# Patient Record
Sex: MALE | Race: WHITE | NOT HISPANIC OR LATINO | Employment: FULL TIME | URBAN - METROPOLITAN AREA
[De-identification: names, ages, dates, MRNs, and addresses within clinical notes are randomized per-mention and may not be internally consistent; named-entity substitution may affect disease eponyms.]

---

## 2023-12-31 DIAGNOSIS — K52.9 NONINFECTIVE GASTROENTERITIS AND COLITIS, UNSPECIFIED: ICD-10-CM

## 2024-01-03 ENCOUNTER — TELEPHONE (OUTPATIENT)
Dept: GASTROENTEROLOGY | Facility: CLINIC | Age: 64
End: 2024-01-03
Payer: COMMERCIAL

## 2024-01-03 DIAGNOSIS — K52.9 NONINFECTIVE GASTROENTERITIS AND COLITIS, UNSPECIFIED: ICD-10-CM

## 2024-01-03 RX ORDER — BUDESONIDE 9 MG/1
TABLET, EXTENDED RELEASE ORAL
Qty: 15 TABLET | Refills: 8 | Status: SHIPPED | OUTPATIENT
Start: 2024-01-03 | End: 2024-02-09

## 2024-01-26 ENCOUNTER — OFFICE VISIT (OUTPATIENT)
Dept: GASTROENTEROLOGY | Facility: CLINIC | Age: 64
End: 2024-01-26
Payer: COMMERCIAL

## 2024-01-26 VITALS
OXYGEN SATURATION: 96 % | DIASTOLIC BLOOD PRESSURE: 92 MMHG | WEIGHT: 170 LBS | SYSTOLIC BLOOD PRESSURE: 169 MMHG | BODY MASS INDEX: 23.8 KG/M2 | HEIGHT: 71 IN | HEART RATE: 78 BPM

## 2024-01-26 DIAGNOSIS — K52.832 LYMPHOCYTIC COLITIS: Primary | ICD-10-CM

## 2024-01-26 PROCEDURE — 99213 OFFICE O/P EST LOW 20 MIN: CPT | Performed by: INTERNAL MEDICINE

## 2024-01-26 RX ORDER — ASPIRIN 81 MG/1
1 TABLET ORAL
COMMUNITY
Start: 2022-06-07

## 2024-01-26 RX ORDER — ATORVASTATIN CALCIUM 20 MG/1
20 TABLET, FILM COATED ORAL DAILY
COMMUNITY
End: 2024-04-10 | Stop reason: SDUPTHER

## 2024-01-26 RX ORDER — FLUTICASONE PROPIONATE AND SALMETEROL 250; 50 UG/1; UG/1
1 POWDER RESPIRATORY (INHALATION) EVERY 12 HOURS
COMMUNITY
Start: 2021-11-29

## 2024-01-26 NOTE — PATIENT INSTRUCTIONS
63-year-old man with history of lymphocytic colitis controlled on budesonide EC 9 mg every other day, 3 times per week.  He reported 2-3 formed bowel movements daily in the morning every day.  He denied diarrhea or mucus in stool or blood in stool.  He had no further complaints.  Plan:  Continue budesonide at the current dose 9 mg every other day 3 times a week.  His colonoscopy in 2016 did not reveal any polyps.  He was advised to return for follow-up colonoscopy in 2026.

## 2024-01-26 NOTE — PROGRESS NOTES
Subjective   Patient ID: Pedro Figueroa is a 63 y.o. male who presents for Follow-up (Colitis / budesonide/Colon 2016).  HPI  The patient returns for follow-up visit today.  He has a diagnosis of lymphocytic colitis confirmed on colonoscopy with biopsies in September 2016.  He has been treated with budesonide EC 9 mg daily since then.  Over time his dose was dropped to budesonide 9 mg every other day, 3 times per week for the past 2 to 3 years.  He has done quite well on this reduced regimen.  Today he reported 2-3 generally formed bowel movements fasting in the morning.  The patient is pleased with the current situation of his bowel movements.  There is no mucus or blood in his stool.      Review of Systems  Constitutional: no fever, no chills, fatigue,  not feeling tired and no recent weight loss. No reports of dizziness.  SKIN: No skin rash or lesions  EYE: No pain photophobia, diplopia or blurred vision  EAR: No discharge, pain or hearing loss  NOSE: No congestion, epistaxis or obstruction  RESPIRATORY: No cough , dyspnea or  chest pain   CV: No chest pain, lower extremity swelling or palpitations  GE: No abdominal pain, nausea, vomiting, dysphagia or odynophagia. Denied constipation , diarrhea  : No dysuria or hematuria, urinary incontinence or urine frequency  NEURO: Denied weakness, confusion, dizziness, or numbness   PSYCH : Denies anxiety, hallucinations or insomnia  HEME/ LYMPH : Denied bleeding , bruising or enlarged nodes  ENDOCRINE: No change in weight, polydipsia, or abnormal bleeding  .       Objective   Head and neck examinations were  unremarkable. There was no temporal wasting. No jaundice noted. No thyromegaly.  No carotid bruits.  There is no peripheral lymphadenopathy.  Lungs were clear to auscultation. There when no rales, rhonchi or wheezes.  Cardiac examination revealed normal heart sounds. Rhythm was sinus . There were no murmurs.  Abdomen was full and soft. There was no organomegaly.  Bowel sounds were normo- active. There was no ascites. The abdomen was nontender.  Rectal examination was not done.  Extremities: No edema or joint swelling.  Neurological examination: Patient was alert, oriented in person place, and time. There when no focal neurological signs. Cranial nerves were grossly intact. No evidence of encephalopathy. There was no asterixis. The  plantar response was flexor.    Physical Exam    Assessment/Plan   63-year-old man with history of lymphocytic colitis controlled on budesonide EC 9 mg every other day, 3 times per week.  He reported 2-3 formed bowel movements daily in the morning every day.  He denied diarrhea or mucus in stool or blood in stool.  He had no further complaints.  Plan:  Continue budesonide at the current dose 9 mg every other day 3 times a week.  His colonoscopy in 2016 did not reveal any polyps.  He was advised to return for follow-up colonoscopy in 2026.           Darnell Prater MD 01/26/24 8:59 AM

## 2024-02-09 RX ORDER — BUDESONIDE 9 MG/1
TABLET, EXTENDED RELEASE ORAL
Qty: 16 TABLET | Refills: 8 | Status: SHIPPED | OUTPATIENT
Start: 2024-02-09 | End: 2024-04-10 | Stop reason: WASHOUT

## 2024-02-27 ENCOUNTER — TELEPHONE (OUTPATIENT)
Dept: GASTROENTEROLOGY | Facility: CLINIC | Age: 64
End: 2024-02-27
Payer: COMMERCIAL

## 2024-02-27 NOTE — TELEPHONE ENCOUNTER
Refill request for Uceris 9mg to be sent to [facility] here at the hospital.   Please advise      Thank You.

## 2024-03-21 ENCOUNTER — TELEPHONE (OUTPATIENT)
Dept: GASTROENTEROLOGY | Facility: CLINIC | Age: 64
End: 2024-03-21

## 2024-03-21 NOTE — TELEPHONE ENCOUNTER
Patient called in today and asked why he has not received his Budsonide.  I did tell him it has been denied by his insurance.  He is asking for a replacement medication to be sent in for him.

## 2024-03-25 DIAGNOSIS — K52.832 LYMPHOCYTIC COLITIS: Primary | ICD-10-CM

## 2024-03-25 PROCEDURE — RXMED WILLOW AMBULATORY MEDICATION CHARGE

## 2024-03-25 RX ORDER — MESALAMINE 800 MG/1
800 TABLET, DELAYED RELEASE ORAL 3 TIMES DAILY
Qty: 270 TABLET | Refills: 5 | Status: SHIPPED | OUTPATIENT
Start: 2024-03-25 | End: 2024-03-27 | Stop reason: CLARIF

## 2024-03-25 RX ORDER — CHOLESTYRAMINE 4 G/9G
4 POWDER, FOR SUSPENSION ORAL DAILY
Qty: 378 G | Refills: 3 | Status: SHIPPED | OUTPATIENT
Start: 2024-03-25 | End: 2025-03-25

## 2024-03-25 NOTE — TELEPHONE ENCOUNTER
Per Presley:   Called patient and tell him I left him a voicemail regarding his treatment. Remind him I said his insurance company has refused to continue his medications. I have sent in alternative medications to his pharmacy. I will talk with him later if he has other questions.

## 2024-03-27 ENCOUNTER — PHARMACY VISIT (OUTPATIENT)
Dept: PHARMACY | Facility: CLINIC | Age: 64
End: 2024-03-27
Payer: COMMERCIAL

## 2024-03-27 DIAGNOSIS — K52.9 COLITIS: Primary | ICD-10-CM

## 2024-03-27 PROCEDURE — RXMED WILLOW AMBULATORY MEDICATION CHARGE

## 2024-03-27 RX ORDER — MESALAMINE 1.2 G/1
1200 TABLET, DELAYED RELEASE ORAL 2 TIMES DAILY
Qty: 180 TABLET | Refills: 2 | Status: SHIPPED | OUTPATIENT
Start: 2024-03-27 | End: 2025-03-27

## 2024-03-27 NOTE — TELEPHONE ENCOUNTER
Patient notified of the change to mesalamine and cholestyramine. He will call back if any issues.

## 2024-04-10 ENCOUNTER — OFFICE VISIT (OUTPATIENT)
Dept: PRIMARY CARE | Facility: CLINIC | Age: 64
End: 2024-04-10
Payer: COMMERCIAL

## 2024-04-10 VITALS
WEIGHT: 174.6 LBS | HEART RATE: 77 BPM | BODY MASS INDEX: 24.44 KG/M2 | TEMPERATURE: 96.9 F | DIASTOLIC BLOOD PRESSURE: 81 MMHG | RESPIRATION RATE: 20 BRPM | OXYGEN SATURATION: 97 % | SYSTOLIC BLOOD PRESSURE: 153 MMHG | HEIGHT: 71 IN

## 2024-04-10 DIAGNOSIS — E78.1 HYPERTRIGLYCERIDEMIA: ICD-10-CM

## 2024-04-10 DIAGNOSIS — I10 PRIMARY HYPERTENSION: ICD-10-CM

## 2024-04-10 DIAGNOSIS — Z12.2 SCREENING FOR LUNG CANCER: ICD-10-CM

## 2024-04-10 DIAGNOSIS — E53.8 COBALAMIN DEFICIENCY: ICD-10-CM

## 2024-04-10 DIAGNOSIS — L57.0 KERATOSIS: ICD-10-CM

## 2024-04-10 DIAGNOSIS — I70.212 ATHEROSCLER OF NATIVE ARTERY OF LEFT LEG WITH INTERMIT CLAUDICATION (CMS-HCC): ICD-10-CM

## 2024-04-10 DIAGNOSIS — Z12.5 PROSTATE CANCER SCREENING: ICD-10-CM

## 2024-04-10 DIAGNOSIS — J44.9 CHRONIC OBSTRUCTIVE PULMONARY DISEASE, UNSPECIFIED COPD TYPE (MULTI): ICD-10-CM

## 2024-04-10 DIAGNOSIS — K52.832 LYMPHOCYTIC COLITIS: ICD-10-CM

## 2024-04-10 DIAGNOSIS — G47.00 INSOMNIA, UNSPECIFIED TYPE: ICD-10-CM

## 2024-04-10 DIAGNOSIS — Z72.0 TOBACCO USE: Primary | ICD-10-CM

## 2024-04-10 PROBLEM — R19.7 DIARRHEA: Status: RESOLVED | Noted: 2024-04-10 | Resolved: 2024-04-10

## 2024-04-10 PROBLEM — R06.83 SNORING: Status: ACTIVE | Noted: 2024-04-10

## 2024-04-10 PROBLEM — M51.369 DDD (DEGENERATIVE DISC DISEASE), LUMBAR: Status: ACTIVE | Noted: 2024-04-10

## 2024-04-10 PROBLEM — R20.0 NUMBNESS OF LOWER EXTREMITY: Status: ACTIVE | Noted: 2024-04-10

## 2024-04-10 PROBLEM — F41.8 SITUATIONAL ANXIETY: Status: ACTIVE | Noted: 2024-04-10

## 2024-04-10 PROBLEM — F32.A DEPRESSION: Status: ACTIVE | Noted: 2021-04-24

## 2024-04-10 PROBLEM — M54.9 CHRONIC BACK PAIN: Status: ACTIVE | Noted: 2021-04-24

## 2024-04-10 PROBLEM — R20.0 LEFT LEG NUMBNESS: Status: ACTIVE | Noted: 2024-04-10

## 2024-04-10 PROBLEM — K52.9 NONSPECIFIC COLITIS: Status: ACTIVE | Noted: 2024-04-10

## 2024-04-10 PROBLEM — R63.1 POLYDIPSIA: Status: ACTIVE | Noted: 2024-04-10

## 2024-04-10 PROBLEM — G89.29 CHRONIC BACK PAIN: Status: ACTIVE | Noted: 2021-04-24

## 2024-04-10 PROBLEM — N52.9 ERECTILE DYSFUNCTION: Status: ACTIVE | Noted: 2021-04-24

## 2024-04-10 PROBLEM — M51.36 DDD (DEGENERATIVE DISC DISEASE), LUMBAR: Status: ACTIVE | Noted: 2024-04-10

## 2024-04-10 PROBLEM — K13.70 ORAL LESION: Status: RESOLVED | Noted: 2024-04-10 | Resolved: 2024-04-10

## 2024-04-10 PROBLEM — R05.3 CHRONIC COUGH: Status: ACTIVE | Noted: 2021-10-25

## 2024-04-10 PROBLEM — M25.552 LEFT HIP PAIN: Status: ACTIVE | Noted: 2021-10-25

## 2024-04-10 PROBLEM — M79.601 PAIN OF RIGHT UPPER EXTREMITY: Status: RESOLVED | Noted: 2024-04-10 | Resolved: 2024-04-10

## 2024-04-10 PROBLEM — F41.0 GENERALIZED ANXIETY DISORDER WITH PANIC ATTACKS: Status: ACTIVE | Noted: 2021-04-24

## 2024-04-10 PROBLEM — M77.01 GOLFERS ELBOW OF RIGHT UPPER EXTREMITY: Status: RESOLVED | Noted: 2024-04-10 | Resolved: 2024-04-10

## 2024-04-10 PROBLEM — F41.1 GENERALIZED ANXIETY DISORDER WITH PANIC ATTACKS: Status: ACTIVE | Noted: 2021-04-24

## 2024-04-10 PROBLEM — Z86.16 PERSONAL HISTORY OF COVID-19: Status: RESOLVED | Noted: 2021-10-25 | Resolved: 2024-04-10

## 2024-04-10 PROBLEM — B37.0 THRUSH, ORAL: Status: RESOLVED | Noted: 2024-04-10 | Resolved: 2024-04-10

## 2024-04-10 PROBLEM — R40.0 DAYTIME SLEEPINESS: Status: ACTIVE | Noted: 2024-04-10

## 2024-04-10 PROBLEM — I73.9 PERIPHERAL ARTERIAL DISEASE (CMS-HCC): Status: ACTIVE | Noted: 2021-12-07

## 2024-04-10 PROBLEM — I49.9 IRREGULAR HEART RATE: Status: ACTIVE | Noted: 2022-06-07

## 2024-04-10 PROCEDURE — RXMED WILLOW AMBULATORY MEDICATION CHARGE

## 2024-04-10 PROCEDURE — 3077F SYST BP >= 140 MM HG: CPT | Performed by: INTERNAL MEDICINE

## 2024-04-10 PROCEDURE — 3079F DIAST BP 80-89 MM HG: CPT | Performed by: INTERNAL MEDICINE

## 2024-04-10 PROCEDURE — 99214 OFFICE O/P EST MOD 30 MIN: CPT | Performed by: INTERNAL MEDICINE

## 2024-04-10 RX ORDER — CILOSTAZOL 100 MG/1
100 TABLET ORAL 2 TIMES DAILY
COMMUNITY
End: 2024-04-10 | Stop reason: SDUPTHER

## 2024-04-10 RX ORDER — CILOSTAZOL 100 MG/1
100 TABLET ORAL 2 TIMES DAILY
Qty: 180 TABLET | Refills: 3 | Status: SHIPPED | OUTPATIENT
Start: 2024-04-10 | End: 2025-04-10

## 2024-04-10 RX ORDER — ATORVASTATIN CALCIUM 20 MG/1
20 TABLET, FILM COATED ORAL DAILY
Qty: 90 TABLET | Refills: 3 | Status: SHIPPED | OUTPATIENT
Start: 2024-04-10 | End: 2025-04-10

## 2024-04-10 SDOH — ECONOMIC STABILITY: FOOD INSECURITY: WITHIN THE PAST 12 MONTHS, YOU WORRIED THAT YOUR FOOD WOULD RUN OUT BEFORE YOU GOT MONEY TO BUY MORE.: NEVER TRUE

## 2024-04-10 SDOH — ECONOMIC STABILITY: FOOD INSECURITY: WITHIN THE PAST 12 MONTHS, THE FOOD YOU BOUGHT JUST DIDN'T LAST AND YOU DIDN'T HAVE MONEY TO GET MORE.: NEVER TRUE

## 2024-04-10 ASSESSMENT — PATIENT HEALTH QUESTIONNAIRE - PHQ9
SUM OF ALL RESPONSES TO PHQ9 QUESTIONS 1 & 2: 0
2. FEELING DOWN, DEPRESSED OR HOPELESS: NOT AT ALL
1. LITTLE INTEREST OR PLEASURE IN DOING THINGS: NOT AT ALL

## 2024-04-10 ASSESSMENT — LIFESTYLE VARIABLES
SKIP TO QUESTIONS 9-10: 1
AUDIT-C TOTAL SCORE: 3
HOW OFTEN DO YOU HAVE A DRINK CONTAINING ALCOHOL: 2-3 TIMES A WEEK
HOW OFTEN DO YOU HAVE SIX OR MORE DRINKS ON ONE OCCASION: NEVER
HOW MANY STANDARD DRINKS CONTAINING ALCOHOL DO YOU HAVE ON A TYPICAL DAY: 1 OR 2

## 2024-04-10 ASSESSMENT — ENCOUNTER SYMPTOMS
DEPRESSION: 0
LOSS OF SENSATION IN FEET: 0
OCCASIONAL FEELINGS OF UNSTEADINESS: 0

## 2024-04-10 ASSESSMENT — PAIN SCALES - GENERAL: PAINLEVEL: 0-NO PAIN

## 2024-04-10 NOTE — PROGRESS NOTES
Chief Complaint/HPI:    Initial visit with me: patient used to see Dr Busby in 2020, patient has seen multiple physicians at [facility] since that time    HTN: takes no med therapy at present    Intermittent claudication of the LLE: patient sees Dr Sherwood for follow up and evaluation, patient takes Pletal and ASA    Hyperlipidemia: takes atorvastatin    Lymphocytic colitis: patient was placed on Uceris per Dr Prater, the patient's insurance would not cover the med, he was just placed on Mesalamine and cholestyramine.    COPD: he uses Advair, patient still smokes. He still smokes 1 ppd    Patient used to see dermatology, patient has developed new skin lesions on the right ear and left face      ROS otherwise negative aside from what was mentioned above in HPI.      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)    Chronic back pain    Chronic cough    Cobalamin deficiency    Daytime sleepiness    DDD (degenerative disc disease), lumbar    Depression    Erectile dysfunction    Generalized anxiety disorder with panic attacks    Hypertriglyceridemia    Insomnia    Snoring    Situational anxiety    Primary hypertension    Polydipsia    Peripheral arterial disease (CMS/HCC)    Nonspecific colitis    Lymphocytic colitis    Irregular heart rate    Left hip pain    Left leg numbness    Numbness of lower extremity    Keratosis         Past Medical History:   Diagnosis Date    Lymphocytic colitis 11/22/2019    Lymphocytic-plasmacytic colitis    Oral lesion 04/10/2024    Other specified health status     No pertinent past medical history    Periumbilical pain 09/06/2016    Periumbilical abdominal pain    Personal history of COVID-19 10/25/2021    Personal history of other diseases of the musculoskeletal system and connective tissue     History of low back pain    Personal history of  "other specified conditions 12/28/2016    History of diarrhea    Residual hemorrhoidal skin tags 11/22/2016    External hemorrhoids    Thrush, oral 04/10/2024     Past Surgical History:   Procedure Laterality Date    OTHER SURGICAL HISTORY  03/24/2021    Jaw surgery     Social History     Social History Narrative    Not on file         ALLERGIES  Patient has no known allergies.      MEDICATIONS  Current Outpatient Medications on File Prior to Visit   Medication Sig Dispense Refill    aspirin 81 mg EC tablet Take 1 tablet (81 mg) by mouth once daily.      cholestyramine (Questran) 4 gram powder Take 4 g by mouth once daily. Dissolve in 8 oz of liquid and drink before a meal 378 g 3    fluticasone propion-salmeteroL (Advair Diskus) 250-50 mcg/dose diskus inhaler Inhale 1 puff every 12 hours.      mesalamine (Lialda) 1.2 gram EC tablet Take 1 tablet (1.2 g) by mouth 2 times a day. Do not crush, chew, or split. 180 tablet 2    [DISCONTINUED] atorvastatin (Lipitor) 20 mg tablet Take 1 tablet (20 mg) by mouth once daily.      [DISCONTINUED] cilostazol (Pletal) 100 mg tablet Take 1 tablet (100 mg) by mouth 2 times a day.      loratadine 10 mg capsule Take 10 mg by mouth once daily.      [DISCONTINUED] budesonide ER (Uceris) 9 mg tablet Take 1 tablet (9 mg) by mouth every other day 4 times per week. (Patient not taking: Reported on 4/10/2024) 144 tablet 0    [DISCONTINUED] Uceris 9 mg tablet TAKE 1 TABLET BY MOUTH EVERY OTHER DAY 4 TIMES PER WEEK (Patient not taking: Reported on 4/10/2024) 16 tablet 8     No current facility-administered medications on file prior to visit.         PHYSICAL EXAM  /81 (BP Location: Right arm, Patient Position: Sitting, BP Cuff Size: Adult)   Pulse 77   Temp 36.1 °C (96.9 °F)   Resp 20   Ht 1.791 m (5' 10.5\")   Wt 79.2 kg (174 lb 9.6 oz)   SpO2 97%   BMI 24.70 kg/m²   Body mass index is 24.7 kg/m².  Gen: Alert, NAD  HEENT:  wears glasses, EOMI, conjunctiva and sclera normal in " appearance, no thyromegaly or neck masses  Respiratory:  Lungs CTAB, slightly diminished breath sounds throughout  Cardiovascular:  Heart RRR. 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   - Continue following with vascular as scheduled/recommended  Last Assessment & Plan:    Formatting of this note might be different from the original.   Chronic, managed by vascular surgery.         Current Assessment & Plan     Patient sees vascular surgery, check lipids also, encourage smoking cessation         Relevant Medications    atorvastatin (Lipitor) 20 mg tablet    cilostazol (Pletal) 100 mg tablet    Other Relevant Orders    CBC and Auto Differential    Comprehensive Metabolic Panel    Cobalamin deficiency    Overview     Last Assessment & Plan:    Formatting of this note might be different from the original.   - Obtain recheck of labwork to determine need for replacement  Last Assessment & Plan:    Formatting of this note might be different from the original.   - Obtain recheck of labwork to determine need for replacement         Current Assessment & Plan     Check a B12 level         Relevant Orders    CBC and Auto Differential    Comprehensive Metabolic Panel    Hypertriglyceridemia    Current Assessment & Plan     Patient is due for  testing, check labs, continue atorvastatin         Relevant Medications    atorvastatin (Lipitor) 20 mg tablet    Other Relevant Orders    CBC and Auto Differential    Comprehensive Metabolic Panel    Lipid Panel    Insomnia    Relevant Orders    CBC and Auto Differential    Comprehensive Metabolic Panel    TSH with reflex to Free T4 if abnormal    Primary hypertension    Overview     Last Assessment & Plan:    Formatting of this note might be different from the original.   - Ok to stay off of Lisinopril - at goal  Last Assessment & Plan:    Formatting of this note might be different from the original.   Controlled on ACEi- will continue         Current Assessment & Plan     Slightly elevated BP, check urine albumin, patient used to take lisinopril, he no longer takes it         Relevant Orders    Albumin , Urine Random    CBC and Auto Differential    Comprehensive Metabolic Panel    Lymphocytic colitis    Overview     Last Assessment & Plan:    Formatting of this note might be different from the original.   - Continue following with GI as scheduled/recommended  Last Assessment & Plan:    Formatting of this note might be different from the original.   Chronic, managed by GI.         Current Assessment & Plan     Meds per GI, sees Dr Prater         Relevant Orders    CBC and Auto Differential    Comprehensive Metabolic Panel    Vitamin D 25-Hydroxy,Total (for eval of Vitamin D levels)    Keratosis    Relevant Orders    Referral to Dermatology    CBC and Auto Differential    Comprehensive Metabolic Panel     Other Visit Diagnoses       Tobacco use    -  Primary    Relevant Orders    CT lung screening low dose    CBC and Auto Differential    Comprehensive Metabolic Panel    Screening for lung cancer        Relevant Orders    CT lung screening low dose    CBC and Auto Differential    Comprehensive Metabolic Panel    Prostate cancer screening        Relevant Orders    CBC and Auto Differential    Comprehensive Metabolic  Panel    Prostate Spec.Ag,Screen          Encourage smoking cessation, emphasized the need to quit, check labs and get CT lungs completed    Follow up after testing  Isaac Le MD

## 2024-04-11 ENCOUNTER — PHARMACY VISIT (OUTPATIENT)
Dept: PHARMACY | Facility: CLINIC | Age: 64
End: 2024-04-11
Payer: COMMERCIAL

## 2024-04-12 ENCOUNTER — LAB (OUTPATIENT)
Dept: LAB | Facility: LAB | Age: 64
End: 2024-04-12
Payer: COMMERCIAL

## 2024-04-12 DIAGNOSIS — I10 PRIMARY HYPERTENSION: ICD-10-CM

## 2024-04-12 DIAGNOSIS — Z72.0 TOBACCO USE: ICD-10-CM

## 2024-04-12 DIAGNOSIS — K52.832 LYMPHOCYTIC COLITIS: ICD-10-CM

## 2024-04-12 DIAGNOSIS — E78.1 HYPERTRIGLYCERIDEMIA: ICD-10-CM

## 2024-04-12 DIAGNOSIS — G47.00 INSOMNIA, UNSPECIFIED TYPE: ICD-10-CM

## 2024-04-12 DIAGNOSIS — E53.8 COBALAMIN DEFICIENCY: ICD-10-CM

## 2024-04-12 DIAGNOSIS — Z12.2 SCREENING FOR LUNG CANCER: ICD-10-CM

## 2024-04-12 DIAGNOSIS — I70.212 ATHEROSCLER OF NATIVE ARTERY OF LEFT LEG WITH INTERMIT CLAUDICATION (CMS-HCC): ICD-10-CM

## 2024-04-12 DIAGNOSIS — Z12.5 PROSTATE CANCER SCREENING: ICD-10-CM

## 2024-04-12 DIAGNOSIS — L57.0 KERATOSIS: ICD-10-CM

## 2024-04-12 DIAGNOSIS — J44.9 CHRONIC OBSTRUCTIVE PULMONARY DISEASE, UNSPECIFIED COPD TYPE (MULTI): ICD-10-CM

## 2024-04-12 LAB
25(OH)D3 SERPL-MCNC: 13 NG/ML (ref 30–100)
ALBUMIN SERPL BCP-MCNC: 3.9 G/DL (ref 3.4–5)
ALP SERPL-CCNC: 74 U/L (ref 33–136)
ALT SERPL W P-5'-P-CCNC: 21 U/L (ref 10–52)
ANION GAP SERPL CALC-SCNC: 12 MMOL/L (ref 10–20)
AST SERPL W P-5'-P-CCNC: 21 U/L (ref 9–39)
BASOPHILS # BLD AUTO: 0.01 X10*3/UL (ref 0–0.1)
BASOPHILS NFR BLD AUTO: 0.1 %
BILIRUB SERPL-MCNC: 0.4 MG/DL (ref 0–1.2)
BUN SERPL-MCNC: 8 MG/DL (ref 6–23)
CALCIUM SERPL-MCNC: 8.9 MG/DL (ref 8.6–10.3)
CHLORIDE SERPL-SCNC: 106 MMOL/L (ref 98–107)
CHOLEST SERPL-MCNC: 147 MG/DL (ref 0–199)
CHOLESTEROL/HDL RATIO: 5.1
CO2 SERPL-SCNC: 26 MMOL/L (ref 21–32)
CREAT SERPL-MCNC: 1.11 MG/DL (ref 0.5–1.3)
CREAT UR-MCNC: 560.4 MG/DL (ref 20–370)
EGFRCR SERPLBLD CKD-EPI 2021: 76 ML/MIN/1.73M*2
EOSINOPHIL # BLD AUTO: 0.11 X10*3/UL (ref 0–0.7)
EOSINOPHIL NFR BLD AUTO: 1.5 %
ERYTHROCYTE [DISTWIDTH] IN BLOOD BY AUTOMATED COUNT: 13.3 % (ref 11.5–14.5)
GLUCOSE SERPL-MCNC: 92 MG/DL (ref 74–99)
HCT VFR BLD AUTO: 50.8 % (ref 41–52)
HDLC SERPL-MCNC: 28.9 MG/DL
HGB BLD-MCNC: 16.5 G/DL (ref 13.5–17.5)
IMM GRANULOCYTES # BLD AUTO: 0.02 X10*3/UL (ref 0–0.7)
IMM GRANULOCYTES NFR BLD AUTO: 0.3 % (ref 0–0.9)
LDLC SERPL CALC-MCNC: 73 MG/DL
LYMPHOCYTES # BLD AUTO: 1.55 X10*3/UL (ref 1.2–4.8)
LYMPHOCYTES NFR BLD AUTO: 21.3 %
MCH RBC QN AUTO: 32.3 PG (ref 26–34)
MCHC RBC AUTO-ENTMCNC: 32.5 G/DL (ref 32–36)
MCV RBC AUTO: 99 FL (ref 80–100)
MICROALBUMIN UR-MCNC: 46.4 MG/L
MICROALBUMIN/CREAT UR: 8.3 UG/MG CREAT
MONOCYTES # BLD AUTO: 0.96 X10*3/UL (ref 0.1–1)
MONOCYTES NFR BLD AUTO: 13.2 %
NEUTROPHILS # BLD AUTO: 4.61 X10*3/UL (ref 1.2–7.7)
NEUTROPHILS NFR BLD AUTO: 63.6 %
NON HDL CHOLESTEROL: 118 MG/DL (ref 0–149)
NRBC BLD-RTO: 0 /100 WBCS (ref 0–0)
PLATELET # BLD AUTO: 229 X10*3/UL (ref 150–450)
POTASSIUM SERPL-SCNC: 3.8 MMOL/L (ref 3.5–5.3)
PROT SERPL-MCNC: 6.7 G/DL (ref 6.4–8.2)
PSA SERPL-MCNC: 2.69 NG/ML
RBC # BLD AUTO: 5.11 X10*6/UL (ref 4.5–5.9)
SODIUM SERPL-SCNC: 140 MMOL/L (ref 136–145)
TRIGL SERPL-MCNC: 227 MG/DL (ref 0–149)
TSH SERPL-ACNC: 1.78 MIU/L (ref 0.44–3.98)
VLDL: 45 MG/DL (ref 0–40)
WBC # BLD AUTO: 7.3 X10*3/UL (ref 4.4–11.3)

## 2024-04-12 PROCEDURE — 85025 COMPLETE CBC W/AUTO DIFF WBC: CPT

## 2024-04-12 PROCEDURE — 82306 VITAMIN D 25 HYDROXY: CPT

## 2024-04-12 PROCEDURE — 84153 ASSAY OF PSA TOTAL: CPT

## 2024-04-12 PROCEDURE — 82570 ASSAY OF URINE CREATININE: CPT

## 2024-04-12 PROCEDURE — 84443 ASSAY THYROID STIM HORMONE: CPT

## 2024-04-12 PROCEDURE — 82043 UR ALBUMIN QUANTITATIVE: CPT

## 2024-04-12 PROCEDURE — 80061 LIPID PANEL: CPT

## 2024-04-12 PROCEDURE — 80053 COMPREHEN METABOLIC PANEL: CPT

## 2024-04-12 PROCEDURE — 36415 COLL VENOUS BLD VENIPUNCTURE: CPT

## 2024-04-18 LAB — COTININE UR QL SCN: POSITIVE

## 2024-04-24 ENCOUNTER — HOSPITAL ENCOUNTER (OUTPATIENT)
Dept: RADIOLOGY | Facility: CLINIC | Age: 64
Discharge: HOME | End: 2024-04-24
Payer: COMMERCIAL

## 2024-04-24 DIAGNOSIS — Z72.0 TOBACCO USE: ICD-10-CM

## 2024-04-24 DIAGNOSIS — Z12.2 SCREENING FOR LUNG CANCER: ICD-10-CM

## 2024-04-24 PROCEDURE — 71271 CT THORAX LUNG CANCER SCR C-: CPT

## 2024-05-09 PROCEDURE — 88305 TISSUE EXAM BY PATHOLOGIST: CPT | Performed by: DERMATOLOGY

## 2024-05-13 ENCOUNTER — LAB REQUISITION (OUTPATIENT)
Dept: DERMATOPATHOLOGY | Facility: CLINIC | Age: 64
End: 2024-05-13
Payer: COMMERCIAL

## 2024-05-13 DIAGNOSIS — L98.8 OTHER SPECIFIED DISORDERS OF THE SKIN AND SUBCUTANEOUS TISSUE: ICD-10-CM

## 2024-05-14 LAB
LABORATORY COMMENT REPORT: NORMAL
PATH REPORT.FINAL DX SPEC: NORMAL
PATH REPORT.GROSS SPEC: NORMAL
PATH REPORT.MICROSCOPIC SPEC OTHER STN: NORMAL
PATH REPORT.RELEVANT HX SPEC: NORMAL
PATH REPORT.TOTAL CANCER: NORMAL

## 2024-07-09 PROCEDURE — RXMED WILLOW AMBULATORY MEDICATION CHARGE

## 2024-07-10 ENCOUNTER — APPOINTMENT (OUTPATIENT)
Dept: PRIMARY CARE | Facility: CLINIC | Age: 64
End: 2024-07-10
Payer: COMMERCIAL

## 2024-07-10 VITALS
HEIGHT: 71 IN | RESPIRATION RATE: 16 BRPM | TEMPERATURE: 97.5 F | DIASTOLIC BLOOD PRESSURE: 70 MMHG | HEART RATE: 70 BPM | OXYGEN SATURATION: 97 % | SYSTOLIC BLOOD PRESSURE: 134 MMHG | BODY MASS INDEX: 23.38 KG/M2 | WEIGHT: 167 LBS

## 2024-07-10 DIAGNOSIS — E53.8 COBALAMIN DEFICIENCY: ICD-10-CM

## 2024-07-10 DIAGNOSIS — E78.1 HYPERTRIGLYCERIDEMIA: ICD-10-CM

## 2024-07-10 DIAGNOSIS — D04.9 SQUAMOUS CELL CARCINOMA IN SITU (SCCIS) OF SKIN: ICD-10-CM

## 2024-07-10 DIAGNOSIS — I73.9 PERIPHERAL ARTERIAL DISEASE (CMS-HCC): ICD-10-CM

## 2024-07-10 DIAGNOSIS — E55.9 VITAMIN D DEFICIENCY: ICD-10-CM

## 2024-07-10 DIAGNOSIS — I70.212 ATHEROSCLER OF NATIVE ARTERY OF LEFT LEG WITH INTERMIT CLAUDICATION (CMS-HCC): ICD-10-CM

## 2024-07-10 DIAGNOSIS — C44.91 CANCER OF THE SKIN, BASAL CELL: ICD-10-CM

## 2024-07-10 DIAGNOSIS — J44.9 CHRONIC OBSTRUCTIVE PULMONARY DISEASE, UNSPECIFIED COPD TYPE (MULTI): ICD-10-CM

## 2024-07-10 DIAGNOSIS — K52.832 LYMPHOCYTIC COLITIS: ICD-10-CM

## 2024-07-10 DIAGNOSIS — I10 PRIMARY HYPERTENSION: Primary | ICD-10-CM

## 2024-07-10 PROCEDURE — 3075F SYST BP GE 130 - 139MM HG: CPT | Performed by: INTERNAL MEDICINE

## 2024-07-10 PROCEDURE — 3078F DIAST BP <80 MM HG: CPT | Performed by: INTERNAL MEDICINE

## 2024-07-10 PROCEDURE — 99214 OFFICE O/P EST MOD 30 MIN: CPT | Performed by: INTERNAL MEDICINE

## 2024-07-10 PROCEDURE — RXMED WILLOW AMBULATORY MEDICATION CHARGE

## 2024-07-10 RX ORDER — ACETAMINOPHEN 500 MG
4000 TABLET ORAL DAILY
Qty: 200 CAPSULE | Refills: 2
Start: 2024-07-10

## 2024-07-10 RX ORDER — ATORVASTATIN CALCIUM 20 MG/1
20 TABLET, FILM COATED ORAL DAILY
Qty: 90 TABLET | Refills: 3 | Status: SHIPPED | OUTPATIENT
Start: 2024-07-10 | End: 2025-07-10

## 2024-07-10 SDOH — ECONOMIC STABILITY: FOOD INSECURITY: WITHIN THE PAST 12 MONTHS, YOU WORRIED THAT YOUR FOOD WOULD RUN OUT BEFORE YOU GOT MONEY TO BUY MORE.: NEVER TRUE

## 2024-07-10 SDOH — ECONOMIC STABILITY: FOOD INSECURITY: WITHIN THE PAST 12 MONTHS, THE FOOD YOU BOUGHT JUST DIDN'T LAST AND YOU DIDN'T HAVE MONEY TO GET MORE.: NEVER TRUE

## 2024-07-10 ASSESSMENT — PATIENT HEALTH QUESTIONNAIRE - PHQ9
SUM OF ALL RESPONSES TO PHQ9 QUESTIONS 1 & 2: 0
1. LITTLE INTEREST OR PLEASURE IN DOING THINGS: NOT AT ALL
2. FEELING DOWN, DEPRESSED OR HOPELESS: NOT AT ALL

## 2024-07-10 ASSESSMENT — LIFESTYLE VARIABLES
HOW MANY STANDARD DRINKS CONTAINING ALCOHOL DO YOU HAVE ON A TYPICAL DAY: 1 OR 2
HOW OFTEN DO YOU HAVE SIX OR MORE DRINKS ON ONE OCCASION: NEVER
AUDIT-C TOTAL SCORE: 3
SKIP TO QUESTIONS 9-10: 1
HOW OFTEN DO YOU HAVE A DRINK CONTAINING ALCOHOL: 2-3 TIMES A WEEK

## 2024-07-10 ASSESSMENT — PAIN SCALES - GENERAL: PAINLEVEL: 9

## 2024-07-10 NOTE — PROGRESS NOTES
Chief Complaint/HPI:    HTN: takes no med therapy at present     Intermittent claudication of the LLE: patient sees Dr Sherwood for follow up and evaluation, patient takes Pletal and ASA     Hyperlipidemia: takes atorvastatin, labs were recently completed     Lymphocytic colitis: patient was placed on Uceris per Dr Prater, the patient's insurance would not cover the med, he was started on mesalamine, he could not tolerate cholestyramine     COPD: ran out of Advair, patient still smokes. He still smokes 1 ppd, patient feels like he did not need it. He uses it as needed only. Lung cancer screening was completed. He still smokes     Patient saw dermatology, he had skin lesions removed, basal cell carcinoma, focal squamous carcinoma in situ: patient is applying cream now. (Efudex)     Vitamin D deficiency: patient does not take vitamin d     ROS otherwise negative aside from what was mentioned above in HPI.      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)    Chronic back pain    Chronic cough    Cobalamin deficiency    Daytime sleepiness    DDD (degenerative disc disease), lumbar    Depression    Erectile dysfunction    Generalized anxiety disorder with panic attacks    Hypertriglyceridemia    Insomnia    Snoring    Situational anxiety    Primary hypertension    Polydipsia    Peripheral arterial disease (CMS-HCC)    Nonspecific colitis    Lymphocytic colitis    Irregular heart rate    Left hip pain    Left leg numbness    Numbness of lower extremity    Keratosis    Cancer of the skin, basal cell    Squamous cell carcinoma in situ (SCCIS) of skin    Vitamin D deficiency         Past Medical History:   Diagnosis Date    Lymphocytic colitis 11/22/2019    Lymphocytic-plasmacytic colitis    Oral lesion 04/10/2024    Other specified health status     No pertinent past medical  "history    Periumbilical pain 09/06/2016    Periumbilical abdominal pain    Personal history of COVID-19 10/25/2021    Personal history of other diseases of the musculoskeletal system and connective tissue     History of low back pain    Personal history of other specified conditions 12/28/2016    History of diarrhea    Residual hemorrhoidal skin tags 11/22/2016    External hemorrhoids    Thrush, oral 04/10/2024     Past Surgical History:   Procedure Laterality Date    OTHER SURGICAL HISTORY  03/24/2021    Jaw surgery    SKIN BIOPSY  04/2024     Social History     Social History Narrative    Not on file         ALLERGIES  Patient has no known allergies.      MEDICATIONS  Current Outpatient Medications on File Prior to Visit   Medication Sig Dispense Refill    aspirin 81 mg EC tablet Take 1 tablet (81 mg) by mouth once daily.      cilostazol (Pletal) 100 mg tablet Take 1 tablet (100 mg) by mouth 2 times a day. 180 tablet 3    fluorouracil (Efudex) 5 % cream Apply topically to affected area twice a day for 4 weeks 40 g 0    fluticasone propion-salmeteroL (Advair Diskus) 250-50 mcg/dose diskus inhaler Inhale 1 puff every 12 hours.      loratadine 10 mg capsule Take 10 mg by mouth once daily.      [DISCONTINUED] atorvastatin (Lipitor) 20 mg tablet Take 1 tablet (20 mg) by mouth once daily. 90 tablet 3    mesalamine (Lialda) 1.2 gram EC tablet Take 1 tablet (1.2 g) by mouth 2 times a day. Do not crush, chew, or split. 180 tablet 2    [DISCONTINUED] cholestyramine (Questran) 4 gram powder Take 4 g by mouth once daily. Dissolve in 8 oz of liquid and drink before a meal (Patient not taking: Reported on 7/10/2024) 378 g 3     No current facility-administered medications on file prior to visit.         PHYSICAL EXAM  /70 (BP Location: Left arm, Patient Position: Sitting, BP Cuff Size: Adult)   Pulse 70   Temp 36.4 °C (97.5 °F)   Resp 16   Ht 1.791 m (5' 10.5\")   Wt 75.8 kg (167 lb)   SpO2 97%   BMI 23.62 kg/m² "   Body mass index is 23.62 kg/m².    Gen: Alert, NAD  HEENT:  wears glasses, EOMI, conjunctiva and sclera normal in appearance, no thyromegaly or neck masses  Respiratory:  Lungs CTAB, slightly diminished breath sounds throughout, no wheezes or rhonchi  Cardiovascular:  Heart RRR. 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   - Continue following with vascular as scheduled/recommended  Last Assessment & Plan:    Formatting of this note might be different from the original.   Chronic, managed by vascular surgery.         Relevant Medications    atorvastatin (Lipitor) 20 mg tablet    Other Relevant Orders    Comprehensive Metabolic Panel    CBC and Auto Differential    Cancer of the skin, basal cell    Relevant Orders    Comprehensive Metabolic Panel    CBC and Auto Differential    Chronic obstructive pulmonary disease, unspecified COPD type (Multi)    Current Assessment & Plan     No med therapy for now, quit smoking         Relevant Orders    Comprehensive Metabolic Panel    CBC and Auto Differential    Cobalamin deficiency    Overview     Last Assessment & Plan:    Formatting of this note might be different from the original.   - Obtain recheck of labwork to determine need for replacement  Last Assessment & Plan:    Formatting of this note might be different from the original.   - Obtain recheck of labwork to determine need for replacement         Relevant Orders    Vitamin B12    Hypertriglyceridemia    Current Assessment & Plan     LDL is controlled, TG level is slightly elevated, avoid added sweets in the diet.         Relevant Medications     atorvastatin (Lipitor) 20 mg tablet    Other Relevant Orders    Comprehensive Metabolic Panel    Lipid Panel    CBC and Auto Differential    Lymphocytic colitis    Overview     Last Assessment & Plan:    Formatting of this note might be different from the original.   - Continue following with GI as scheduled/recommended  Last Assessment & Plan:    Formatting of this note might be different from the original.   Chronic, managed by GI.         Current Assessment & Plan     Continue mesalamine, follow up with GI         Relevant Orders    Comprehensive Metabolic Panel    CBC and Auto Differential    Peripheral arterial disease (CMS-HCC)    Overview     Last Assessment & Plan:    Formatting of this note might be different from the original.   Chronic, managed by vascular surgery.         Current Assessment & Plan     No med changes, encourage complete smoking cessation         Relevant Orders    Comprehensive Metabolic Panel    CBC and Auto Differential    Primary hypertension - Primary    Current Assessment & Plan     BP is controlled now, will monitor, no meds at present time          Relevant Orders    Albumin-Creatinine Ratio, Urine Random    Comprehensive Metabolic Panel    CBC and Auto Differential    Squamous cell carcinoma in situ (SCCIS) of skin    Current Assessment & Plan     Patient sees dermatology for follow up, he is using Efudex now         Relevant Orders    Comprehensive Metabolic Panel    CBC and Auto Differential    Vitamin D deficiency    Current Assessment & Plan     Will start vitamin d 4000 units daily, recheck labs in 6 months         Relevant Medications    cholecalciferol (Vitamin D3) 50 mcg (2,000 unit) capsule    Other Relevant Orders    Comprehensive Metabolic Panel    Vitamin D 25-Hydroxy,Total (for eval of Vitamin D levels)    CBC and Auto Differential     Check labs ordered prior to next visit, quit smoking, start vitamin d 4000  units daily    Follow up in 6 months    Isaac DELGADO  MD Mimi

## 2024-07-16 ENCOUNTER — PHARMACY VISIT (OUTPATIENT)
Dept: PHARMACY | Facility: CLINIC | Age: 64
End: 2024-07-16
Payer: COMMERCIAL

## 2024-10-05 ENCOUNTER — PHARMACY VISIT (OUTPATIENT)
Dept: PHARMACY | Facility: CLINIC | Age: 64
End: 2024-10-05
Payer: COMMERCIAL

## 2024-10-05 PROCEDURE — RXMED WILLOW AMBULATORY MEDICATION CHARGE

## 2024-11-16 ENCOUNTER — PHARMACY VISIT (OUTPATIENT)
Dept: PHARMACY | Facility: CLINIC | Age: 64
End: 2024-11-16
Payer: COMMERCIAL

## 2024-11-16 PROCEDURE — RXMED WILLOW AMBULATORY MEDICATION CHARGE

## 2024-12-11 ENCOUNTER — LAB (OUTPATIENT)
Dept: LAB | Facility: LAB | Age: 64
End: 2024-12-11
Payer: COMMERCIAL

## 2024-12-11 DIAGNOSIS — C44.91 CANCER OF THE SKIN, BASAL CELL: ICD-10-CM

## 2024-12-11 DIAGNOSIS — I73.9 PERIPHERAL ARTERIAL DISEASE (CMS-HCC): ICD-10-CM

## 2024-12-11 DIAGNOSIS — I10 PRIMARY HYPERTENSION: ICD-10-CM

## 2024-12-11 DIAGNOSIS — I70.212 ATHEROSCLER OF NATIVE ARTERY OF LEFT LEG WITH INTERMIT CLAUDICATION (CMS-HCC): ICD-10-CM

## 2024-12-11 DIAGNOSIS — E53.8 COBALAMIN DEFICIENCY: ICD-10-CM

## 2024-12-11 DIAGNOSIS — E78.1 HYPERTRIGLYCERIDEMIA: ICD-10-CM

## 2024-12-11 DIAGNOSIS — K52.832 LYMPHOCYTIC COLITIS: ICD-10-CM

## 2024-12-11 DIAGNOSIS — J44.9 CHRONIC OBSTRUCTIVE PULMONARY DISEASE, UNSPECIFIED COPD TYPE (MULTI): ICD-10-CM

## 2024-12-11 DIAGNOSIS — E55.9 VITAMIN D DEFICIENCY: ICD-10-CM

## 2024-12-11 DIAGNOSIS — D04.9 SQUAMOUS CELL CARCINOMA IN SITU (SCCIS) OF SKIN: ICD-10-CM

## 2024-12-11 LAB
25(OH)D3 SERPL-MCNC: 20 NG/ML (ref 30–100)
ALBUMIN SERPL BCP-MCNC: 4 G/DL (ref 3.4–5)
ALP SERPL-CCNC: 68 U/L (ref 33–136)
ALT SERPL W P-5'-P-CCNC: 22 U/L (ref 10–52)
ANION GAP SERPL CALC-SCNC: 11 MMOL/L (ref 10–20)
AST SERPL W P-5'-P-CCNC: 20 U/L (ref 9–39)
BASOPHILS # BLD AUTO: 0.05 X10*3/UL (ref 0–0.1)
BASOPHILS NFR BLD AUTO: 0.5 %
BILIRUB SERPL-MCNC: 0.5 MG/DL (ref 0–1.2)
BUN SERPL-MCNC: 15 MG/DL (ref 6–23)
CALCIUM SERPL-MCNC: 9 MG/DL (ref 8.6–10.3)
CHLORIDE SERPL-SCNC: 108 MMOL/L (ref 98–107)
CHOLEST SERPL-MCNC: 117 MG/DL (ref 0–199)
CHOLESTEROL/HDL RATIO: 2.3
CO2 SERPL-SCNC: 27 MMOL/L (ref 21–32)
CREAT SERPL-MCNC: 0.92 MG/DL (ref 0.5–1.3)
EGFRCR SERPLBLD CKD-EPI 2021: >90 ML/MIN/1.73M*2
EOSINOPHIL # BLD AUTO: 0.17 X10*3/UL (ref 0–0.7)
EOSINOPHIL NFR BLD AUTO: 1.8 %
ERYTHROCYTE [DISTWIDTH] IN BLOOD BY AUTOMATED COUNT: 13.2 % (ref 11.5–14.5)
GLUCOSE SERPL-MCNC: 93 MG/DL (ref 74–99)
HCT VFR BLD AUTO: 46.6 % (ref 41–52)
HDLC SERPL-MCNC: 50.5 MG/DL
HGB BLD-MCNC: 15.4 G/DL (ref 13.5–17.5)
IMM GRANULOCYTES # BLD AUTO: 0.03 X10*3/UL (ref 0–0.7)
IMM GRANULOCYTES NFR BLD AUTO: 0.3 % (ref 0–0.9)
LDLC SERPL CALC-MCNC: 45 MG/DL
LYMPHOCYTES # BLD AUTO: 2.4 X10*3/UL (ref 1.2–4.8)
LYMPHOCYTES NFR BLD AUTO: 25.4 %
MCH RBC QN AUTO: 32.6 PG (ref 26–34)
MCHC RBC AUTO-ENTMCNC: 33 G/DL (ref 32–36)
MCV RBC AUTO: 99 FL (ref 80–100)
MONOCYTES # BLD AUTO: 0.83 X10*3/UL (ref 0.1–1)
MONOCYTES NFR BLD AUTO: 8.8 %
NEUTROPHILS # BLD AUTO: 5.96 X10*3/UL (ref 1.2–7.7)
NEUTROPHILS NFR BLD AUTO: 63.2 %
NON HDL CHOLESTEROL: 67 MG/DL (ref 0–149)
NRBC BLD-RTO: 0 /100 WBCS (ref 0–0)
PLATELET # BLD AUTO: 243 X10*3/UL (ref 150–450)
POTASSIUM SERPL-SCNC: 4.8 MMOL/L (ref 3.5–5.3)
PROT SERPL-MCNC: 6.3 G/DL (ref 6.4–8.2)
RBC # BLD AUTO: 4.72 X10*6/UL (ref 4.5–5.9)
SODIUM SERPL-SCNC: 141 MMOL/L (ref 136–145)
TRIGL SERPL-MCNC: 106 MG/DL (ref 0–149)
VIT B12 SERPL-MCNC: 159 PG/ML (ref 211–911)
VLDL: 21 MG/DL (ref 0–40)
WBC # BLD AUTO: 9.4 X10*3/UL (ref 4.4–11.3)

## 2024-12-11 PROCEDURE — 80061 LIPID PANEL: CPT

## 2024-12-11 PROCEDURE — 85025 COMPLETE CBC W/AUTO DIFF WBC: CPT

## 2024-12-11 PROCEDURE — 82306 VITAMIN D 25 HYDROXY: CPT

## 2024-12-11 PROCEDURE — 82607 VITAMIN B-12: CPT

## 2024-12-11 PROCEDURE — 80053 COMPREHEN METABOLIC PANEL: CPT

## 2024-12-23 ENCOUNTER — LAB (OUTPATIENT)
Dept: LAB | Facility: LAB | Age: 64
End: 2024-12-23

## 2024-12-23 DIAGNOSIS — I10 PRIMARY HYPERTENSION: ICD-10-CM

## 2024-12-23 LAB
CREAT UR-MCNC: 26.9 MG/DL (ref 20–370)
MICROALBUMIN UR-MCNC: 7.3 MG/L
MICROALBUMIN/CREAT UR: 27.1 UG/MG CREAT

## 2024-12-23 PROCEDURE — 82043 UR ALBUMIN QUANTITATIVE: CPT

## 2024-12-23 PROCEDURE — 82570 ASSAY OF URINE CREATININE: CPT

## 2025-01-05 PROCEDURE — RXMED WILLOW AMBULATORY MEDICATION CHARGE

## 2025-01-07 ENCOUNTER — PHARMACY VISIT (OUTPATIENT)
Dept: PHARMACY | Facility: CLINIC | Age: 65
End: 2025-01-07
Payer: COMMERCIAL

## 2025-01-14 ENCOUNTER — APPOINTMENT (OUTPATIENT)
Dept: PRIMARY CARE | Facility: CLINIC | Age: 65
End: 2025-01-14
Payer: COMMERCIAL

## 2025-01-14 VITALS
WEIGHT: 176 LBS | TEMPERATURE: 97.7 F | HEIGHT: 71 IN | DIASTOLIC BLOOD PRESSURE: 94 MMHG | BODY MASS INDEX: 24.64 KG/M2 | OXYGEN SATURATION: 99 % | RESPIRATION RATE: 16 BRPM | SYSTOLIC BLOOD PRESSURE: 184 MMHG | HEART RATE: 56 BPM

## 2025-01-14 DIAGNOSIS — Z00.00 ROUTINE ADULT HEALTH MAINTENANCE: Primary | ICD-10-CM

## 2025-01-14 DIAGNOSIS — E53.8 COBALAMIN DEFICIENCY: ICD-10-CM

## 2025-01-14 DIAGNOSIS — Z12.2 SCREENING FOR LUNG CANCER: ICD-10-CM

## 2025-01-14 DIAGNOSIS — K52.832 LYMPHOCYTIC COLITIS: ICD-10-CM

## 2025-01-14 DIAGNOSIS — E78.1 HYPERTRIGLYCERIDEMIA: ICD-10-CM

## 2025-01-14 DIAGNOSIS — E55.9 VITAMIN D DEFICIENCY: ICD-10-CM

## 2025-01-14 DIAGNOSIS — J44.9 CHRONIC OBSTRUCTIVE PULMONARY DISEASE, UNSPECIFIED COPD TYPE (MULTI): ICD-10-CM

## 2025-01-14 DIAGNOSIS — I10 PRIMARY HYPERTENSION: ICD-10-CM

## 2025-01-14 DIAGNOSIS — I70.212 ATHEROSCLER OF NATIVE ARTERY OF LEFT LEG WITH INTERMIT CLAUDICATION (CMS-HCC): ICD-10-CM

## 2025-01-14 PROCEDURE — 3008F BODY MASS INDEX DOCD: CPT | Performed by: INTERNAL MEDICINE

## 2025-01-14 PROCEDURE — 3080F DIAST BP >= 90 MM HG: CPT | Performed by: INTERNAL MEDICINE

## 2025-01-14 PROCEDURE — 3077F SYST BP >= 140 MM HG: CPT | Performed by: INTERNAL MEDICINE

## 2025-01-14 PROCEDURE — 96372 THER/PROPH/DIAG INJ SC/IM: CPT | Performed by: INTERNAL MEDICINE

## 2025-01-14 PROCEDURE — RXMED WILLOW AMBULATORY MEDICATION CHARGE

## 2025-01-14 PROCEDURE — 99214 OFFICE O/P EST MOD 30 MIN: CPT | Performed by: INTERNAL MEDICINE

## 2025-01-14 PROCEDURE — 4004F PT TOBACCO SCREEN RCVD TLK: CPT | Performed by: INTERNAL MEDICINE

## 2025-01-14 RX ORDER — LISINOPRIL 10 MG/1
10 TABLET ORAL DAILY
Qty: 100 TABLET | Refills: 2 | Status: SHIPPED | OUTPATIENT
Start: 2025-01-14 | End: 2026-02-18

## 2025-01-14 RX ORDER — CYANOCOBALAMIN 1000 UG/ML
1000 INJECTION, SOLUTION INTRAMUSCULAR; SUBCUTANEOUS ONCE
Status: COMPLETED | OUTPATIENT
Start: 2025-01-14 | End: 2025-01-14

## 2025-01-14 RX ORDER — ATORVASTATIN CALCIUM 20 MG/1
20 TABLET, FILM COATED ORAL DAILY
Qty: 90 TABLET | Refills: 3 | Status: SHIPPED | OUTPATIENT
Start: 2025-01-14 | End: 2026-01-14

## 2025-01-14 RX ADMIN — CYANOCOBALAMIN 1000 MCG: 1000 INJECTION, SOLUTION INTRAMUSCULAR; SUBCUTANEOUS at 18:41

## 2025-01-14 SDOH — ECONOMIC STABILITY: FOOD INSECURITY: WITHIN THE PAST 12 MONTHS, THE FOOD YOU BOUGHT JUST DIDN'T LAST AND YOU DIDN'T HAVE MONEY TO GET MORE.: NEVER TRUE

## 2025-01-14 SDOH — ECONOMIC STABILITY: FOOD INSECURITY: WITHIN THE PAST 12 MONTHS, YOU WORRIED THAT YOUR FOOD WOULD RUN OUT BEFORE YOU GOT MONEY TO BUY MORE.: NEVER TRUE

## 2025-01-14 ASSESSMENT — PATIENT HEALTH QUESTIONNAIRE - PHQ9
1. LITTLE INTEREST OR PLEASURE IN DOING THINGS: NOT AT ALL
2. FEELING DOWN, DEPRESSED OR HOPELESS: NOT AT ALL
SUM OF ALL RESPONSES TO PHQ9 QUESTIONS 1 & 2: 0

## 2025-01-14 ASSESSMENT — LIFESTYLE VARIABLES
HOW MANY STANDARD DRINKS CONTAINING ALCOHOL DO YOU HAVE ON A TYPICAL DAY: 1 OR 2
AUDIT-C TOTAL SCORE: 3
HOW OFTEN DO YOU HAVE SIX OR MORE DRINKS ON ONE OCCASION: NEVER
HOW OFTEN DO YOU HAVE A DRINK CONTAINING ALCOHOL: 2-3 TIMES A WEEK
SKIP TO QUESTIONS 9-10: 1

## 2025-01-14 NOTE — PROGRESS NOTES
"Chief Complaint/HPI:  HTN: takes no med therapy at present, he is agreeable to be treated for HTN     Intermittent claudication of the LLE: patient sees Dr Sherwood for follow up and evaluation, patient takes Pletal and ASA, patient cannot follow up with Dr Sherwood now due to insurance, he will follow up with  vascular surgery when needed.       Hyperlipidemia: takes atorvastatin, labs were recently completed     Lymphocytic colitis: patient has been taking mesalamine, he still gets urge to have a BM. Patient has 3 BM s prior to going to work in the morning. He drinks coffee at home in the morning, he does not drink coffee at work. He has cut down on consumption of coffee. He will see a new gastroenterologist soon. Patient used to see Dr Prater     COPD/ smoker: patient still smokes. He uses no inhaler therapy. Lung cancer screening was completed. Patient quit smoking for a few weeks, he resumed the smoking.  Patient is considering trial of Aculaser treatment in [place]. Patient states that treatment is \"guaranteed\". He would like to try this prior to Chantix.  Last lung cancer screening was completed on 4/30/2024. Patient still smokes 1 ppd, he has smoked for about 40 years.      Patient saw dermatology, he had skin lesions removed, basal cell carcinoma, focal squamous carcinoma in situ: no longer uses Efudex     Vitamin D deficiency:  he has vitamin D, he does not take it regularly     B12 deficiency: patient had some numbness in the hands last summer, he does not have any numbness now. He does not regularly take B12.          ROS otherwise negative aside from what was mentioned above in HPI.      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)    Chronic back pain    Chronic cough    Cobalamin deficiency    Daytime sleepiness    DDD (degenerative disc disease), lumbar "    Depression    Erectile dysfunction    Generalized anxiety disorder with panic attacks    Hypertriglyceridemia    Insomnia    Snoring    Situational anxiety    Primary hypertension    Polydipsia    Peripheral arterial disease (CMS-HCC)    Nonspecific colitis    Lymphocytic colitis    Irregular heart rate    Left hip pain    Left leg numbness    Numbness of lower extremity    Keratosis    Cancer of the skin, basal cell    Squamous cell carcinoma in situ (SCCIS) of skin    Vitamin D deficiency         Past Medical History:   Diagnosis Date    Lymphocytic colitis 11/22/2019    Lymphocytic-plasmacytic colitis    Oral lesion 04/10/2024    Other specified health status     No pertinent past medical history    Periumbilical pain 09/06/2016    Periumbilical abdominal pain    Personal history of COVID-19 10/25/2021    Personal history of other diseases of the musculoskeletal system and connective tissue     History of low back pain    Personal history of other specified conditions 12/28/2016    History of diarrhea    Residual hemorrhoidal skin tags 11/22/2016    External hemorrhoids    Thrush, oral 04/10/2024     Past Surgical History:   Procedure Laterality Date    OTHER SURGICAL HISTORY  03/24/2021    Jaw surgery    SKIN BIOPSY  04/2024     Social History     Social History Narrative    Not on file         ALLERGIES  Patient has no known allergies.      MEDICATIONS  Current Outpatient Medications on File Prior to Visit   Medication Sig Dispense Refill    aspirin 81 mg EC tablet Take 1 tablet (81 mg) by mouth once daily.      cholecalciferol (Vitamin D3) 50 mcg (2,000 unit) capsule Take 2 capsules (100 mcg) by mouth once daily. 200 capsule 2    cilostazol (Pletal) 100 mg tablet Take 1 tablet (100 mg) by mouth 2 times a day. 180 tablet 3    loratadine 10 mg capsule Take 10 mg by mouth once daily.      mesalamine (Lialda) 1.2 gram EC tablet Take 1 tablet (1.2 g) by mouth 2 times a day. Do not crush, chew, or split. 180  "tablet 2    [DISCONTINUED] atorvastatin (Lipitor) 20 mg tablet Take 1 tablet (20 mg) by mouth once daily. 90 tablet 3    [DISCONTINUED] fluorouracil (Efudex) 5 % cream Apply topically to affected area twice a day for 4 weeks 40 g 0    [DISCONTINUED] fluticasone propion-salmeteroL (Advair Diskus) 250-50 mcg/dose diskus inhaler Inhale 1 puff every 12 hours. (Patient not taking: Reported on 1/14/2025)       No current facility-administered medications on file prior to visit.         PHYSICAL EXAM  BP (!) 184/94 (BP Location: Left arm, Patient Position: Sitting, BP Cuff Size: Adult)   Pulse 56   Temp 36.5 °C (97.7 °F) (Temporal)   Resp 16   Ht 1.791 m (5' 10.5\")   Wt 79.8 kg (176 lb)   SpO2 99%   BMI 24.90 kg/m²   Body mass index is 24.9 kg/m².  Gen: Alert, NAD  HEENT:  wears glasses, EOMI, conjunctiva and sclera normal in appearance, no thyromegaly or neck masses  Respiratory:  Lungs CTAB, slightly diminished breath sounds throughout, no wheezes or rhonchi  Cardiovascular:  Heart RRR. 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   - Continue following with vascular as scheduled/recommended  Last Assessment & Plan:    Formatting of this note might be different from the original.   Chronic, managed by vascular surgery.         Relevant Medications    atorvastatin (Lipitor) 20 mg tablet    Other Relevant Orders    CBC and Auto Differential    Comprehensive Metabolic Panel    Referral to Vascular Surgery    Chronic obstructive pulmonary disease, unspecified COPD type (Multi)    Current Assessment & " Plan     Patient is a smoker, will refer for lung cancer screening this year, he plans to get laser treatment for smoking cessation. Follow up in 6 months         Relevant Orders    CBC and Auto Differential    Comprehensive Metabolic Panel    CT lung screening low dose    Cobalamin deficiency    Overview     Last Assessment & Plan:    Formatting of this note might be different from the original.   - Obtain recheck of labwork to determine need for replacement  Last Assessment & Plan:    Formatting of this note might be different from the original.   - Obtain recheck of labwork to determine need for replacement         Current Assessment & Plan     B12 injection ordered today, patient has a history of lymphocytic colitis, start oral B12 daily.  Recheck labs in about 4-6 months         Relevant Medications    cyanocobalamin (Vitamin B-12) injection 1,000 mcg (Start on 1/14/2025  6:30 PM)    Other Relevant Orders    CBC and Auto Differential    Comprehensive Metabolic Panel    Vitamin B12    Hypertriglyceridemia    Relevant Medications    atorvastatin (Lipitor) 20 mg tablet    Other Relevant Orders    CBC and Auto Differential    Comprehensive Metabolic Panel    Lipid Panel    Lymphocytic colitis    Overview     Last Assessment & Plan:    Formatting of this note might be different from the original.   - Continue following with GI as scheduled/recommended  Last Assessment & Plan:    Formatting of this note might be different from the original.   Chronic, managed by GI.         Relevant Orders    Referral to Gastroenterology    CBC and Auto Differential    Comprehensive Metabolic Panel    Primary hypertension    Current Assessment & Plan     BP is markedly elevated today, start lisinopril 10 mg daily, recheck vitals in 2-3 weeks         Relevant Medications    lisinopril 10 mg tablet    Other Relevant Orders    Albumin-Creatinine Ratio, Urine Random    CBC and Auto Differential    Comprehensive Metabolic Panel    Vitamin  D deficiency    Relevant Orders    Vitamin D 25-Hydroxy,Total (for eval of Vitamin D levels)     Other Visit Diagnoses       Routine adult health maintenance    -  Primary    Relevant Medications    respiratory syncytial virus, RSV, vaccine, adjuvanted, age 60y+ (Arexvy) 120 mcg/0.5 mL suspension for reconstitution    Other Relevant Orders    CBC and Auto Differential    Comprehensive Metabolic Panel    Screening for lung cancer        Relevant Orders    CT lung screening low dose          Check labs prior to next visit, follow up in 6 months    Isaac Le MD

## 2025-01-14 NOTE — ASSESSMENT & PLAN NOTE
Patient is a smoker, will refer for lung cancer screening this year, he plans to get laser treatment for smoking cessation. Follow up in 6 months

## 2025-01-14 NOTE — ASSESSMENT & PLAN NOTE
B12 injection ordered today, patient has a history of lymphocytic colitis, start oral B12 daily.  Recheck labs in about 4-6 months

## 2025-01-16 ENCOUNTER — PHARMACY VISIT (OUTPATIENT)
Dept: PHARMACY | Facility: CLINIC | Age: 65
End: 2025-01-16
Payer: COMMERCIAL

## 2025-03-31 ENCOUNTER — APPOINTMENT (OUTPATIENT)
Facility: CLINIC | Age: 65
End: 2025-03-31
Payer: COMMERCIAL

## 2025-03-31 VITALS — BODY MASS INDEX: 23.72 KG/M2 | WEIGHT: 165.7 LBS | OXYGEN SATURATION: 98 % | HEIGHT: 70 IN | HEART RATE: 84 BPM

## 2025-03-31 DIAGNOSIS — K52.832 LYMPHOCYTIC COLITIS: ICD-10-CM

## 2025-03-31 DIAGNOSIS — R19.7 DIARRHEA, UNSPECIFIED TYPE: Primary | ICD-10-CM

## 2025-03-31 PROCEDURE — 4004F PT TOBACCO SCREEN RCVD TLK: CPT | Performed by: INTERNAL MEDICINE

## 2025-03-31 PROCEDURE — 3008F BODY MASS INDEX DOCD: CPT | Performed by: INTERNAL MEDICINE

## 2025-03-31 PROCEDURE — 99214 OFFICE O/P EST MOD 30 MIN: CPT | Performed by: INTERNAL MEDICINE

## 2025-03-31 RX ORDER — SODIUM, POTASSIUM,MAG SULFATES 17.5-3.13G
SOLUTION, RECONSTITUTED, ORAL ORAL
Qty: 354 ML | Refills: 0 | Status: SHIPPED | OUTPATIENT
Start: 2025-03-31

## 2025-03-31 NOTE — LETTER
March 31, 2025     Isaac Le MD  [address]  [address]  [facility] [id number]    Patient: Pedro Figueroa   YOB: 1960   Date of Visit: 3/31/2025       Dear Dr. Isaac Le MD:    Thank you for referring Pedro Figueroa to me for evaluation. Below are my notes for this consultation.  If you have questions, please do not hesitate to call me. I look forward to following your patient along with you.       Sincerely,     Robin Prado MD, MS      CC: No Recipients  ______________________________________________________________________________________    Primary Care Provider: Isaac Le MD  Referring Provider: Isaac Le, *  My final recommendations will be communicated back to the referring physician and/or primary care provider via shared medical records or via US mail.    Chief Complaint (Reason for visit):   Pedro Figueroa is a 64 y.o. male who presents for diarrhea    ASSESSMENT AND PLAN     Assessment & Plan  Diarrhea, unspecified type  Patient carries a diagnosis of ulcerative colitis vs  Lymphocytic colitis    Patient thinks that he has ulcerative colitis and he is on Lialda  Although per Dr. Prater's note, he has lymphocytic colitis and has been treated with budesonide    2016 cscope reviewed -no left colonic involvement with decreased vascular marking  Pathology-not able to review the final diagnosis    -I recommend a repeat colonoscopy with biopsies  -Further therapy to be dependent on the pathology  Orders:  •  Colonoscopy Diagnostic; Future    Lymphocytic colitis    Orders:  •  Referral to Gastroenterology  •  Colonoscopy Diagnostic; Future      I discussed the risks, benefits and alternative(s) of the procedure(s) with the patient. Pt verbalizes understanding and is willing to proceed. All questions were answered.    Robin Prado MD, MS    SUBJECTIVE   HPI: History obtained from patient    64-year-old male who comes to see me for diarrhea    Pt has 3 BMs  per day  Also has gassiness throughout the day    Patient carries a diagnosis of ulcerative colitis vs  Lymphocytic colitis    Patient thinks that he has ulcerative colitis and he is on Lialda  Although per Dr. 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/22/2019    Lymphocytic-plasmacytic colitis   • Oral lesion 04/10/2024   • Other specified health status     No pertinent past medical history   • Periumbilical pain 09/06/2016    Periumbilical abdominal pain   • Personal history of COVID-19 10/25/2021   • Personal history of other diseases of the musculoskeletal system and connective tissue     History of low back pain   • Personal history of other specified conditions 12/28/2016    History of diarrhea   • Residual hemorrhoidal skin tags 11/22/2016    External hemorrhoids   • Thrush, oral 04/10/2024       Past Surgical History:   Procedure Laterality Date   • OTHER SURGICAL HISTORY  03/24/2021    Jaw surgery   • SKIN BIOPSY  04/2024       Current Outpatient Medications   Medication Sig Dispense Refill   • aspirin 81 mg EC tablet Take 1 tablet (81 mg) by mouth once daily.     • atorvastatin (Lipitor) 20 mg tablet Take 1 tablet (20 mg) by mouth once daily. 90 tablet 3   • cholecalciferol (Vitamin D3) 50 mcg (2,000 unit) capsule Take 2 capsules (100 mcg) by mouth once daily. 200 capsule 2   • cilostazol (Pletal) 100 mg tablet Take 1 tablet (100 mg) by mouth 2 times a day. 180 tablet 3   • lisinopril 10 mg tablet Take 1 tablet (10 mg) by mouth once daily. 100 tablet 2   • loratadine 10 mg capsule Take 10 mg by mouth once daily.     • mesalamine (Lialda) 1.2 gram EC tablet Take 1 tablet (1.2 g) by mouth 2 times a day. Do not crush, chew, or split. 180 tablet 2     No current  "facility-administered medications for this visit.       No Known Allergies  OBJECTIVE   PHYSICAL EXAM:  Pulse 84   Ht 1.778 m (5' 10\")   Wt 75.2 kg (165 lb 11.2 oz)   SpO2 98%   BMI 23.78 kg/m²      LABS/IMAGING/SCOPES  Lab Results   Component Value Date    WBC 9.4 12/11/2024    HGB 15.4 12/11/2024    HCT 46.6 12/11/2024    MCV 99 12/11/2024     12/11/2024     Lab Results   Component Value Date    GLUCOSE 93 12/11/2024    CALCIUM 9.0 12/11/2024     12/11/2024    K 4.8 12/11/2024    CO2 27 12/11/2024     (H) 12/11/2024    BUN 15 12/11/2024    CREATININE 0.92 12/11/2024     Lab Results   Component Value Date    ALT 22 12/11/2024    AST 20 12/11/2024    ALKPHOS 68 12/11/2024    BILITOT 0.5 12/11/2024       === 04/24/24 ===    CT LUNG SCREENING LOW DOSE    - Impression -  1. There are no suspicious parenchymal lung nodules.  2. Recommend continued 1 year low-dose chest CT for surveillance.      LUNG RADS CATEGORY:  Lung Rad: Lung-RADS 2 (Benign Appearance or Indolent Behavior)    Recommendation: Continue annual screening with Low Dose Chest CT in  12 months, recommended as per [employer]  Guidelines Lung-RADS Version 2022.      MACRO:  None    Signed by: Vinicius Guadalupe 4/30/2024 7:02 PM  Dictation workstation:   [id number]    Robin Prado MD, MS  "

## 2025-03-31 NOTE — PROGRESS NOTES
Primary Care Provider: Isaac Le MD  Referring Provider: Isaac Le, *  My final recommendations will be communicated back to the referring physician and/or primary care provider via shared medical records or via US mail.    Chief Complaint (Reason for visit):   Pedro Figueroa is a 64 y.o. male who presents for diarrhea    ASSESSMENT AND PLAN     Assessment & Plan  Diarrhea, unspecified type  Patient carries a diagnosis of ulcerative colitis vs  Lymphocytic colitis    Patient thinks that he has ulcerative colitis and he is on Lialda  Although per Dr. Prater's note, he has lymphocytic colitis and has been treated with budesonide    2016 cscope reviewed -no left colonic involvement with decreased vascular marking  Pathology-not able to review the final diagnosis    -I recommend a repeat colonoscopy with biopsies  -Further therapy to be dependent on the pathology  Orders:    Colonoscopy Diagnostic; Future    Lymphocytic colitis    Orders:    Referral to Gastroenterology    Colonoscopy Diagnostic; Future      I discussed the risks, benefits and alternative(s) of the procedure(s) with the patient. Pt verbalizes understanding and is willing to proceed. All questions were answered.    Robin Prado MD, MS    SUBJECTIVE   HPI: History obtained from patient    64-year-old male who comes to see me for diarrhea    Pt has 3 BMs per day  Also has gassiness throughout the day    Patient carries a diagnosis of ulcerative colitis vs  Lymphocytic colitis    Patient thinks that he has ulcerative colitis and he is on Lialda  Although per Dr. 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/2019    Lymphocytic-plasmacytic colitis    Oral lesion 04/10/2024    Other specified  "health status     No pertinent past medical history    Periumbilical pain 09/06/2016    Periumbilical abdominal pain    Personal history of COVID-19 10/25/2021    Personal history of other diseases of the musculoskeletal system and connective tissue     History of low back pain    Personal history of other specified conditions 12/28/2016    History of diarrhea    Residual hemorrhoidal skin tags 11/22/2016    External hemorrhoids    Thrush, oral 04/10/2024       Past Surgical History:   Procedure Laterality Date    OTHER SURGICAL HISTORY  03/24/2021    Jaw surgery    SKIN BIOPSY  04/2024       Current Outpatient Medications   Medication Sig Dispense Refill    aspirin 81 mg EC tablet Take 1 tablet (81 mg) by mouth once daily.      atorvastatin (Lipitor) 20 mg tablet Take 1 tablet (20 mg) by mouth once daily. 90 tablet 3    cholecalciferol (Vitamin D3) 50 mcg (2,000 unit) capsule Take 2 capsules (100 mcg) by mouth once daily. 200 capsule 2    cilostazol (Pletal) 100 mg tablet Take 1 tablet (100 mg) by mouth 2 times a day. 180 tablet 3    lisinopril 10 mg tablet Take 1 tablet (10 mg) by mouth once daily. 100 tablet 2    loratadine 10 mg capsule Take 10 mg by mouth once daily.      mesalamine (Lialda) 1.2 gram EC tablet Take 1 tablet (1.2 g) by mouth 2 times a day. Do not crush, chew, or split. 180 tablet 2     No current facility-administered medications for this visit.       No Known Allergies  OBJECTIVE   PHYSICAL EXAM:  Pulse 84   Ht 1.778 m (5' 10\")   Wt 75.2 kg (165 lb 11.2 oz)   SpO2 98%   BMI 23.78 kg/m²      LABS/IMAGING/SCOPES  Lab Results   Component Value Date    WBC 9.4 12/11/2024    HGB 15.4 12/11/2024    HCT 46.6 12/11/2024    MCV 99 12/11/2024     12/11/2024     Lab Results   Component Value Date    GLUCOSE 93 12/11/2024    CALCIUM 9.0 12/11/2024     12/11/2024    K 4.8 12/11/2024    CO2 27 12/11/2024     (H) 12/11/2024    BUN 15 12/11/2024    CREATININE 0.92 12/11/2024     Lab " Results   Component Value Date    ALT 22 12/11/2024    AST 20 12/11/2024    ALKPHOS 68 12/11/2024    BILITOT 0.5 12/11/2024       === 04/24/24 ===    CT LUNG SCREENING LOW DOSE    - Impression -  1. There are no suspicious parenchymal lung nodules.  2. Recommend continued 1 year low-dose chest CT for surveillance.      LUNG RADS CATEGORY:  Lung Rad: Lung-RADS 2 (Benign Appearance or Indolent Behavior)    Recommendation: Continue annual screening with Low Dose Chest CT in  12 months, recommended as per [employer]  Guidelines Lung-RADS Version 2022.      MACRO:  None    Signed by: Vinicius Guadalupe 4/30/2024 7:02 PM  Dictation workstation:   [id number]    Robin Prado MD, MS

## 2025-04-14 ENCOUNTER — PHARMACY VISIT (OUTPATIENT)
Dept: PHARMACY | Facility: CLINIC | Age: 65
End: 2025-04-14
Payer: COMMERCIAL

## 2025-04-14 DIAGNOSIS — K52.9 COLITIS: ICD-10-CM

## 2025-04-14 PROCEDURE — RXMED WILLOW AMBULATORY MEDICATION CHARGE

## 2025-04-14 RX ORDER — MESALAMINE 1.2 G/1
1200 TABLET, DELAYED RELEASE ORAL 2 TIMES DAILY
Qty: 180 TABLET | Refills: 1 | Status: SHIPPED | OUTPATIENT
Start: 2025-04-14 | End: 2026-04-14

## 2025-04-19 ENCOUNTER — PHARMACY VISIT (OUTPATIENT)
Dept: PHARMACY | Facility: CLINIC | Age: 65
End: 2025-04-19
Payer: COMMERCIAL

## 2025-04-28 ENCOUNTER — HOSPITAL ENCOUNTER (OUTPATIENT)
Dept: RADIOLOGY | Facility: CLINIC | Age: 65
Discharge: HOME | End: 2025-04-28
Payer: COMMERCIAL

## 2025-04-28 DIAGNOSIS — Z12.2 SCREENING FOR LUNG CANCER: ICD-10-CM

## 2025-04-28 DIAGNOSIS — J44.9 CHRONIC OBSTRUCTIVE PULMONARY DISEASE, UNSPECIFIED COPD TYPE (MULTI): ICD-10-CM

## 2025-04-28 PROCEDURE — 71271 CT THORAX LUNG CANCER SCR C-: CPT

## 2025-04-29 ENCOUNTER — APPOINTMENT (OUTPATIENT)
Dept: VASCULAR SURGERY | Facility: CLINIC | Age: 65
End: 2025-04-29
Payer: COMMERCIAL

## 2025-05-09 ENCOUNTER — APPOINTMENT (OUTPATIENT)
Dept: GASTROENTEROLOGY | Facility: EXTERNAL LOCATION | Age: 65
End: 2025-05-09
Payer: COMMERCIAL

## 2025-05-09 DIAGNOSIS — R19.7 DIARRHEA, UNSPECIFIED TYPE: Primary | ICD-10-CM

## 2025-05-09 DIAGNOSIS — K52.839 MICROSCOPIC COLITIS, UNSPECIFIED MICROSCOPIC COLITIS TYPE: ICD-10-CM

## 2025-05-09 DIAGNOSIS — D12.3 BENIGN NEOPLASM OF TRANSVERSE COLON: ICD-10-CM

## 2025-05-09 DIAGNOSIS — D12.5 BENIGN NEOPLASM OF SIGMOID COLON: ICD-10-CM

## 2025-05-09 DIAGNOSIS — K51.90 ULCERATIVE COLITIS WITHOUT COMPLICATIONS, UNSPECIFIED LOCATION (MULTI): ICD-10-CM

## 2025-05-09 DIAGNOSIS — D12.2 BENIGN NEOPLASM OF ASCENDING COLON: ICD-10-CM

## 2025-05-09 DIAGNOSIS — K52.832 LYMPHOCYTIC COLITIS: ICD-10-CM

## 2025-05-09 PROCEDURE — 45385 COLONOSCOPY W/LESION REMOVAL: CPT | Performed by: INTERNAL MEDICINE

## 2025-05-09 PROCEDURE — 45380 COLONOSCOPY AND BIOPSY: CPT | Performed by: INTERNAL MEDICINE

## 2025-05-09 NOTE — PROGRESS NOTES
Procedure note is completed in [employer]. Report is scanned under 'Media' tab in [employer].    Likely [facility], (not [facility])    - off Lialda  - on Budesonide    Robin Prado MD, MS

## 2025-05-27 ENCOUNTER — APPOINTMENT (OUTPATIENT)
Dept: VASCULAR SURGERY | Facility: HOSPITAL | Age: 65
End: 2025-05-27
Payer: COMMERCIAL

## 2025-05-27 DIAGNOSIS — I70.212 ATHEROSCLER OF NATIVE ARTERY OF LEFT LEG WITH INTERMIT CLAUDICATION: ICD-10-CM

## 2025-05-27 RX ORDER — CILOSTAZOL 100 MG/1
100 TABLET ORAL 2 TIMES DAILY
Qty: 180 TABLET | Refills: 3 | Status: CANCELLED | OUTPATIENT
Start: 2025-05-27 | End: 2026-05-27

## 2025-05-27 RX ORDER — CILOSTAZOL 100 MG/1
100 TABLET ORAL 2 TIMES DAILY
Qty: 180 TABLET | Refills: 3 | Status: SHIPPED | OUTPATIENT
Start: 2025-05-27 | End: 2026-05-27

## 2025-05-28 PROCEDURE — RXMED WILLOW AMBULATORY MEDICATION CHARGE

## 2025-05-31 ENCOUNTER — PHARMACY VISIT (OUTPATIENT)
Dept: PHARMACY | Facility: CLINIC | Age: 65
End: 2025-05-31
Payer: COMMERCIAL

## 2025-06-03 PROCEDURE — RXMED WILLOW AMBULATORY MEDICATION CHARGE

## 2025-06-09 ENCOUNTER — OFFICE VISIT (OUTPATIENT)
Facility: CLINIC | Age: 65
End: 2025-06-09
Payer: COMMERCIAL

## 2025-06-09 DIAGNOSIS — K52.832 LYMPHOCYTIC COLITIS: ICD-10-CM

## 2025-06-09 DIAGNOSIS — R19.7 DIARRHEA, UNSPECIFIED TYPE: Primary | ICD-10-CM

## 2025-06-09 PROCEDURE — 99214 OFFICE O/P EST MOD 30 MIN: CPT | Performed by: INTERNAL MEDICINE

## 2025-06-09 NOTE — PROGRESS NOTES
Primary Care Provider: Isaac Le MD  Referring Provider: Isaac Le, *  My final recommendations will be communicated back to the referring physician and/or primary care provider via shared medical records or via US mail.    Chief Complaint (Reason for visit):   Pedro Figueroa is a 64 y.o. male who presents for diarrhea    Informed consent to proceed with audiovisual telehealth visit was obtained from the patient. The patient is aware of the risks and benefits of participating in a telehealth encounter. Further, the patient understands that services will be billed to insurance for this telehealth encounter and they also understand they would be responsible for any copayments/ deductibles per their plan. The patient confirmed this. The encounter took place with patient at his/her home and the physician at the physician's office. The encounter was performed using [employer].    ASSESSMENT AND PLAN     Assessment & Plan  Diarrhea, unspecified type  3/2025  Patient carries a diagnosis of ulcerative colitis vs  Lymphocytic colitis    Patient thinks that he has ulcerative colitis and he is on Lialda  Although per Dr. Prater's note, he has lymphocytic colitis and has been treated with budesonide    2016 cscope reviewed -no left colonic involvement with decreased vascular marking  Pathology-not able to review the final diagnosis    -I recommend a repeat colonoscopy with biopsies  -Further therapy to be dependent on the pathology  Orders:    Colonoscopy Diagnostic; Future    6/9/2025  Repeat Cscope - normal except polyps. Path - normal mucosa, Tas    Pt has 2 BMs per day (he used to have 3 per day). They are more solid then before.  Patient has retired, and his stress levels are low    - I discussed the option of option of observation without any medications, versus short course of budesonide.  As his symptoms are less, we decided to pursue option A.  He will call me back if the symptoms are  worse.   - I also advised him to start tapering off Lialda.  He can go to 1 tablet once a day for a month-and then stop.   -At any point if his symptoms worsens, I will treat him with a short course of budesonide    Lymphocytic colitis    Orders:    Referral to Gastroenterology    Colonoscopy Diagnostic; Future      I discussed the risks, benefits and alternative(s) of the procedure(s) with the patient. Pt verbalizes understanding and is willing to proceed. All questions were answered.    Robin Prado MD, MS    SUBJECTIVE   HPI: History obtained from patient    64-year-old male who comes to see me for diarrhea    Pt has 3 BMs per day  Also has gassiness throughout the day    Patient carries a diagnosis of ulcerative colitis vs  Lymphocytic colitis    Patient thinks that he has ulcerative colitis and he is on Lialda  Although per Dr. 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/2019    Lymphocytic-plasmacytic colitis    Oral lesion 04/10/2024    Other specified health status     No pertinent past medical history    Periumbilical pain 09/06/2016    Periumbilical abdominal pain    Personal history of COVID-19 10/25/2021    Personal history of other diseases of the musculoskeletal system and connective tissue     History of low back pain    Personal history of other specified conditions 12/28/2016    History of diarrhea    Residual hemorrhoidal skin tags 11/22/2016    External hemorrhoids    Thrush, oral 04/10/2024       Past Surgical History:   Procedure Laterality Date    OTHER SURGICAL HISTORY  03/24/2021    Jaw surgery    SKIN BIOPSY  04/2024       Current Outpatient Medications   Medication Sig Dispense Refill    aspirin 81 mg EC tablet Take 1 tablet (81 mg) by mouth once  "daily.      atorvastatin (Lipitor) 20 mg tablet Take 1 tablet (20 mg) by mouth once daily. 90 tablet 3    cholecalciferol (Vitamin D3) 50 mcg (2,000 unit) capsule Take 2 capsules (100 mcg) by mouth once daily. 200 capsule 2    cilostazol (Pletal) 100 mg tablet Take 1 tablet (100 mg) by mouth 2 times a day. 180 tablet 3    lisinopril 10 mg tablet Take 1 tablet (10 mg) by mouth once daily. 100 tablet 2    loratadine 10 mg capsule Take 10 mg by mouth once daily.      mesalamine (Lialda) 1.2 gram EC tablet Take 1 tablet (1.2 g) by mouth 2 times a day. Do not crush, chew, or split. 180 tablet 2     No current facility-administered medications for this visit.       No Known Allergies  OBJECTIVE   PHYSICAL EXAM:  Pulse 84   Ht 1.778 m (5' 10\")   Wt 75.2 kg (165 lb 11.2 oz)   SpO2 98%   BMI 23.78 kg/m²      LABS/IMAGING/SCOPES  Lab Results   Component Value Date    WBC 9.4 12/11/2024    HGB 15.4 12/11/2024    HCT 46.6 12/11/2024    MCV 99 12/11/2024     12/11/2024     Lab Results   Component Value Date    GLUCOSE 93 12/11/2024    CALCIUM 9.0 12/11/2024     12/11/2024    K 4.8 12/11/2024    CO2 27 12/11/2024     (H) 12/11/2024    BUN 15 12/11/2024    CREATININE 0.92 12/11/2024     Lab Results   Component Value Date    ALT 22 12/11/2024    AST 20 12/11/2024    ALKPHOS 68 12/11/2024    BILITOT 0.5 12/11/2024       === 04/24/24 ===    CT LUNG SCREENING LOW DOSE    - Impression -  1. There are no suspicious parenchymal lung nodules.  2. Recommend continued 1 year low-dose chest CT for surveillance.      LUNG RADS CATEGORY:  Lung Rad: Lung-RADS 2 (Benign Appearance or Indolent Behavior)    Recommendation: Continue annual screening with Low Dose Chest CT in  12 months, recommended as per American College of Radiology  Guidelines Lung-RADS Version 2022.      MACRO:  None    Signed by: Vinicius Guadalupe 4/30/2024 7:02 PM  Dictation workstation:   [id number]    Robin Prado MD, MS  "

## 2025-06-09 NOTE — LETTER
June 9, 2025     Isaac Le MD  [address]  [address]  [facility] [address]    Patient: Pedro Figueroa   YOB: 1960   Date of Visit: 6/9/2025       Dear Dr. Isaac Le MD:    Thank you for referring Pedro Figueroa to me for evaluation. Below are my notes for this consultation.  If you have questions, please do not hesitate to call me. I look forward to following your patient along with you.       Sincerely,     Robin Prado MD, MS      CC: No Recipients  ______________________________________________________________________________________    Primary Care Provider: Isaac Le MD  Referring Provider: Isaac Le, *  My final recommendations will be communicated back to the referring physician and/or primary care provider via shared medical records or via US mail.    Chief Complaint (Reason for visit):   Pedro Figueroa is a 64 y.o. male who presents for diarrhea    Informed consent to proceed with audiovisual telehealth visit was obtained from the patient. The patient is aware of the risks and benefits of participating in a telehealth encounter. Further, the patient understands that services will be billed to insurance for this telehealth encounter and they also understand they would be responsible for any copayments/ deductibles per their plan. The patient confirmed this. The encounter took place with patient at his/her home and the physician at the physician's office. The encounter was performed using [employer].    ASSESSMENT AND PLAN     Assessment & Plan  Diarrhea, unspecified type  3/2025  Patient carries a diagnosis of ulcerative colitis vs  Lymphocytic colitis    Patient thinks that he has ulcerative colitis and he is on Lialda  Although per Dr. Prater's note, he has lymphocytic colitis and has been treated with budesonide    2016 cscope reviewed -no left colonic involvement with decreased vascular marking  Pathology-not able to review the final  diagnosis    -I recommend a repeat colonoscopy with biopsies  -Further therapy to be dependent on the pathology  Orders:  •  Colonoscopy Diagnostic; Future    6/9/2025  Repeat Cscope - normal except polyps. Path - normal mucosa, Tas    Pt has 2 BMs per day (he used to have 3 per day). They are more solid then before.  Patient has retired, and his stress levels are low    - I discussed the option of option of observation without any medications, versus short course of budesonide.  As his symptoms are less, we decided to pursue option A.  He will call me back if the symptoms are worse.   - I also advised him to start tapering off Lialda.  He can go to 1 tablet once a day for a month-and then stop.   -At any point if his symptoms worsens, I will treat him with a short course of budesonide    Lymphocytic colitis    Orders:  •  Referral to Gastroenterology  •  Colonoscopy Diagnostic; Future      I discussed the risks, benefits and alternative(s) of the procedure(s) with the patient. Pt verbalizes understanding and is willing to proceed. All questions were answered.    Robin Prado MD, MS    SUBJECTIVE   HPI: History obtained from patient    64-year-old male who comes to see me for diarrhea    Pt has 3 BMs per day  Also has gassiness throughout the day    Patient carries a diagnosis of ulcerative colitis vs  Lymphocytic colitis    Patient thinks that he has ulcerative colitis and he is on Lialda  Although per Dr. 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/22/2019    Lymphocytic-plasmacytic colitis   • Oral lesion 04/10/2024   • Other specified health status     No pertinent past medical history   • Periumbilical pain 09/06/2016    Periumbilical abdominal pain   • Personal history of  "COVID-19 10/25/2021   • Personal history of other diseases of the musculoskeletal system and connective tissue     History of low back pain   • Personal history of other specified conditions 12/28/2016    History of diarrhea   • Residual hemorrhoidal skin tags 11/22/2016    External hemorrhoids   • Thrush, oral 04/10/2024       Past Surgical History:   Procedure Laterality Date   • OTHER SURGICAL HISTORY  03/24/2021    Jaw surgery   • SKIN BIOPSY  04/2024       Current Outpatient Medications   Medication Sig Dispense Refill   • aspirin 81 mg EC tablet Take 1 tablet (81 mg) by mouth once daily.     • atorvastatin (Lipitor) 20 mg tablet Take 1 tablet (20 mg) by mouth once daily. 90 tablet 3   • cholecalciferol (Vitamin D3) 50 mcg (2,000 unit) capsule Take 2 capsules (100 mcg) by mouth once daily. 200 capsule 2   • cilostazol (Pletal) 100 mg tablet Take 1 tablet (100 mg) by mouth 2 times a day. 180 tablet 3   • lisinopril 10 mg tablet Take 1 tablet (10 mg) by mouth once daily. 100 tablet 2   • loratadine 10 mg capsule Take 10 mg by mouth once daily.     • mesalamine (Lialda) 1.2 gram EC tablet Take 1 tablet (1.2 g) by mouth 2 times a day. Do not crush, chew, or split. 180 tablet 2     No current facility-administered medications for this visit.       No Known Allergies  OBJECTIVE   PHYSICAL EXAM:  Pulse 84   Ht 1.778 m (5' 10\")   Wt 75.2 kg (165 lb 11.2 oz)   SpO2 98%   BMI 23.78 kg/m²      LABS/IMAGING/SCOPES  Lab Results   Component Value Date    WBC 9.4 12/11/2024    HGB 15.4 12/11/2024    HCT 46.6 12/11/2024    MCV 99 12/11/2024     12/11/2024     Lab Results   Component Value Date    GLUCOSE 93 12/11/2024    CALCIUM 9.0 12/11/2024     12/11/2024    K 4.8 12/11/2024    CO2 27 12/11/2024     (H) 12/11/2024    BUN 15 12/11/2024    CREATININE 0.92 12/11/2024     Lab Results   Component Value Date    ALT 22 12/11/2024    AST 20 12/11/2024    ALKPHOS 68 12/11/2024    BILITOT 0.5 12/11/2024 "       === 04/24/24 ===    CT LUNG SCREENING LOW DOSE    - Impression -  1. There are no suspicious parenchymal lung nodules.  2. Recommend continued 1 year low-dose chest CT for surveillance.      LUNG RADS CATEGORY:  Lung Rad: Lung-RADS 2 (Benign Appearance or Indolent Behavior)    Recommendation: Continue annual screening with Low Dose Chest CT in  12 months, recommended as per American College of Radiology  Guidelines Lung-RADS Version 2022.      MACRO:  None    Signed by: Vinicius Guadalupe 4/30/2024 7:02 PM  Dictation workstation:   [id number]    Robin Prado MD, MS

## 2025-06-10 ENCOUNTER — PHARMACY VISIT (OUTPATIENT)
Dept: PHARMACY | Facility: CLINIC | Age: 65
End: 2025-06-10
Payer: COMMERCIAL

## 2025-06-11 LAB
25(OH)D3+25(OH)D2 SERPL-MCNC: 42 NG/ML (ref 30–100)
ALBUMIN SERPL-MCNC: 4.3 G/DL (ref 3.6–5.1)
ALBUMIN/CREAT UR: 5 MG/G CREAT
ALP SERPL-CCNC: 69 U/L (ref 35–144)
ALT SERPL-CCNC: 21 U/L (ref 9–46)
ANION GAP SERPL CALCULATED.4IONS-SCNC: 9 MMOL/L (CALC) (ref 7–17)
AST SERPL-CCNC: 19 U/L (ref 10–35)
BASOPHILS # BLD AUTO: 44 CELLS/UL (ref 0–200)
BASOPHILS NFR BLD AUTO: 0.5 %
BILIRUB SERPL-MCNC: 0.4 MG/DL (ref 0.2–1.2)
BUN SERPL-MCNC: 12 MG/DL (ref 7–25)
CALCIUM SERPL-MCNC: 9.3 MG/DL (ref 8.6–10.3)
CHLORIDE SERPL-SCNC: 105 MMOL/L (ref 98–110)
CHOLEST SERPL-MCNC: 134 MG/DL
CHOLEST/HDLC SERPL: 3.1 (CALC)
CO2 SERPL-SCNC: 25 MMOL/L (ref 20–32)
CREAT SERPL-MCNC: 0.89 MG/DL (ref 0.7–1.35)
CREAT UR-MCNC: 59 MG/DL (ref 20–320)
EGFRCR SERPLBLD CKD-EPI 2021: 96 ML/MIN/1.73M2
EOSINOPHIL # BLD AUTO: 157 CELLS/UL (ref 15–500)
EOSINOPHIL NFR BLD AUTO: 1.8 %
ERYTHROCYTE [DISTWIDTH] IN BLOOD BY AUTOMATED COUNT: 12.5 % (ref 11–15)
GLUCOSE SERPL-MCNC: 90 MG/DL (ref 65–99)
HCT VFR BLD AUTO: 47.8 % (ref 38.5–50)
HDLC SERPL-MCNC: 43 MG/DL
HGB BLD-MCNC: 15.8 G/DL (ref 13.2–17.1)
LDLC SERPL CALC-MCNC: 73 MG/DL (CALC)
LYMPHOCYTES # BLD AUTO: 2419 CELLS/UL (ref 850–3900)
LYMPHOCYTES NFR BLD AUTO: 27.8 %
MCH RBC QN AUTO: 32.9 PG (ref 27–33)
MCHC RBC AUTO-ENTMCNC: 33.1 G/DL (ref 32–36)
MCV RBC AUTO: 99.6 FL (ref 80–100)
MICROALBUMIN UR-MCNC: 0.3 MG/DL
MONOCYTES # BLD AUTO: 887 CELLS/UL (ref 200–950)
MONOCYTES NFR BLD AUTO: 10.2 %
NEUTROPHILS # BLD AUTO: 5194 CELLS/UL (ref 1500–7800)
NEUTROPHILS NFR BLD AUTO: 59.7 %
NONHDLC SERPL-MCNC: 91 MG/DL (CALC)
PLATELET # BLD AUTO: 240 THOUSAND/UL (ref 140–400)
PMV BLD REES-ECKER: 12.9 FL (ref 7.5–12.5)
POTASSIUM SERPL-SCNC: 4.7 MMOL/L (ref 3.5–5.3)
PROT SERPL-MCNC: 6.6 G/DL (ref 6.1–8.1)
RBC # BLD AUTO: 4.8 MILLION/UL (ref 4.2–5.8)
SODIUM SERPL-SCNC: 139 MMOL/L (ref 135–146)
TRIGL SERPL-MCNC: 96 MG/DL
VIT B12 SERPL-MCNC: 307 PG/ML (ref 200–1100)
WBC # BLD AUTO: 8.7 THOUSAND/UL (ref 3.8–10.8)

## 2025-06-16 PROCEDURE — 88305 TISSUE EXAM BY PATHOLOGIST: CPT | Performed by: DERMATOLOGY

## 2025-06-17 ENCOUNTER — APPOINTMENT (OUTPATIENT)
Dept: PRIMARY CARE | Facility: CLINIC | Age: 65
End: 2025-06-17
Payer: COMMERCIAL

## 2025-06-17 ENCOUNTER — LAB REQUISITION (OUTPATIENT)
Dept: DERMATOPATHOLOGY | Facility: CLINIC | Age: 65
End: 2025-06-17
Payer: COMMERCIAL

## 2025-06-17 VITALS
HEIGHT: 71 IN | WEIGHT: 169.9 LBS | OXYGEN SATURATION: 96 % | TEMPERATURE: 96.9 F | RESPIRATION RATE: 20 BRPM | SYSTOLIC BLOOD PRESSURE: 120 MMHG | HEART RATE: 69 BPM | DIASTOLIC BLOOD PRESSURE: 62 MMHG | BODY MASS INDEX: 23.79 KG/M2

## 2025-06-17 DIAGNOSIS — I49.9 IRREGULAR CARDIAC RHYTHM: ICD-10-CM

## 2025-06-17 DIAGNOSIS — E78.1 HYPERTRIGLYCERIDEMIA: ICD-10-CM

## 2025-06-17 DIAGNOSIS — E53.8 COBALAMIN DEFICIENCY: ICD-10-CM

## 2025-06-17 DIAGNOSIS — E55.9 VITAMIN D DEFICIENCY: ICD-10-CM

## 2025-06-17 DIAGNOSIS — J44.9 CHRONIC OBSTRUCTIVE PULMONARY DISEASE, UNSPECIFIED COPD TYPE (MULTI): ICD-10-CM

## 2025-06-17 DIAGNOSIS — M79.672 LEFT FOOT PAIN: ICD-10-CM

## 2025-06-17 DIAGNOSIS — R13.10 FOOD STICKS ON SWALLOWING: ICD-10-CM

## 2025-06-17 DIAGNOSIS — M70.21 OLECRANON BURSITIS OF RIGHT ELBOW: ICD-10-CM

## 2025-06-17 DIAGNOSIS — F17.200 SMOKER: ICD-10-CM

## 2025-06-17 DIAGNOSIS — I10 PRIMARY HYPERTENSION: Primary | ICD-10-CM

## 2025-06-17 DIAGNOSIS — K52.832 LYMPHOCYTIC COLITIS: ICD-10-CM

## 2025-06-17 DIAGNOSIS — L98.8 OTHER SPECIFIED DISORDERS OF THE SKIN AND SUBCUTANEOUS TISSUE: ICD-10-CM

## 2025-06-17 DIAGNOSIS — I70.212 ATHEROSCLER OF NATIVE ARTERY OF LEFT LEG WITH INTERMIT CLAUDICATION: ICD-10-CM

## 2025-06-17 PROCEDURE — 3078F DIAST BP <80 MM HG: CPT | Performed by: INTERNAL MEDICINE

## 2025-06-17 PROCEDURE — 3008F BODY MASS INDEX DOCD: CPT | Performed by: INTERNAL MEDICINE

## 2025-06-17 PROCEDURE — RXMED WILLOW AMBULATORY MEDICATION CHARGE

## 2025-06-17 PROCEDURE — 4004F PT TOBACCO SCREEN RCVD TLK: CPT | Performed by: INTERNAL MEDICINE

## 2025-06-17 PROCEDURE — 99215 OFFICE O/P EST HI 40 MIN: CPT | Performed by: INTERNAL MEDICINE

## 2025-06-17 PROCEDURE — 3074F SYST BP LT 130 MM HG: CPT | Performed by: INTERNAL MEDICINE

## 2025-06-17 PROCEDURE — 93000 ELECTROCARDIOGRAM COMPLETE: CPT | Performed by: INTERNAL MEDICINE

## 2025-06-17 RX ORDER — VIT C/E/ZN/COPPR/LUTEIN/ZEAXAN 250MG-90MG
500 CAPSULE ORAL DAILY
Qty: 100 TABLET | Refills: 2 | Status: SHIPPED | OUTPATIENT
Start: 2025-06-17 | End: 2026-06-17

## 2025-06-17 SDOH — ECONOMIC STABILITY: FOOD INSECURITY: WITHIN THE PAST 12 MONTHS, THE FOOD YOU BOUGHT JUST DIDN'T LAST AND YOU DIDN'T HAVE MONEY TO GET MORE.: NEVER TRUE

## 2025-06-17 SDOH — ECONOMIC STABILITY: FOOD INSECURITY: WITHIN THE PAST 12 MONTHS, YOU WORRIED THAT YOUR FOOD WOULD RUN OUT BEFORE YOU GOT MONEY TO BUY MORE.: NEVER TRUE

## 2025-06-17 ASSESSMENT — PATIENT HEALTH QUESTIONNAIRE - PHQ9
2. FEELING DOWN, DEPRESSED OR HOPELESS: NOT AT ALL
1. LITTLE INTEREST OR PLEASURE IN DOING THINGS: NOT AT ALL
SUM OF ALL RESPONSES TO PHQ9 QUESTIONS 1 & 2: 0

## 2025-06-17 ASSESSMENT — ANXIETY QUESTIONNAIRES
7. FEELING AFRAID AS IF SOMETHING AWFUL MIGHT HAPPEN: NOT AT ALL
2. NOT BEING ABLE TO STOP OR CONTROL WORRYING: NOT AT ALL
4. TROUBLE RELAXING: NOT AT ALL
GAD7 TOTAL SCORE: 0
3. WORRYING TOO MUCH ABOUT DIFFERENT THINGS: NOT AT ALL
1. FEELING NERVOUS, ANXIOUS, OR ON EDGE: NOT AT ALL
6. BECOMING EASILY ANNOYED OR IRRITABLE: NOT AT ALL
IF YOU CHECKED OFF ANY PROBLEMS ON THIS QUESTIONNAIRE, HOW DIFFICULT HAVE THESE PROBLEMS MADE IT FOR YOU TO DO YOUR WORK, TAKE CARE OF THINGS AT HOME, OR GET ALONG WITH OTHER PEOPLE: NOT DIFFICULT AT ALL
5. BEING SO RESTLESS THAT IT IS HARD TO SIT STILL: NOT AT ALL

## 2025-06-17 ASSESSMENT — LIFESTYLE VARIABLES
SKIP TO QUESTIONS 9-10: 1
HOW OFTEN DO YOU HAVE A DRINK CONTAINING ALCOHOL: NEVER
HOW OFTEN DO YOU HAVE SIX OR MORE DRINKS ON ONE OCCASION: NEVER
HOW MANY STANDARD DRINKS CONTAINING ALCOHOL DO YOU HAVE ON A TYPICAL DAY: PATIENT DOES NOT DRINK
AUDIT-C TOTAL SCORE: 0

## 2025-06-17 ASSESSMENT — PAIN SCALES - GENERAL: PAINLEVEL_OUTOF10: 3

## 2025-06-17 ASSESSMENT — ENCOUNTER SYMPTOMS
DEPRESSION: 0
LOSS OF SENSATION IN FEET: 0
OCCASIONAL FEELINGS OF UNSTEADINESS: 0

## 2025-06-17 NOTE — ASSESSMENT & PLAN NOTE
B 12 level is low normal now, patient received one dose of B12 in the recent past, start B 12 1000 mcg daily

## 2025-06-17 NOTE — ASSESSMENT & PLAN NOTE
Strongly encourage smoking cessation, follow up lung cancer CT screening ordered, patient will try laser treatment first, chantix will be ordered if needed

## 2025-06-17 NOTE — PROGRESS NOTES
"Chief Complaint/HPI:    HTN: takes lisinopril now    Swelling right elbow: patient has had localized swelling to right elbow for at least 6 weeks, he denies any injury, patient did cut the right forearm in 3/2025 he states     Pain right foot: patient gets pain on the top of the right foot about 3 months, no swelling is noted. The pain is worse when wearing shoes.     Patient has developed intermittent feeling in esophagus after eating, it feels full after eating. It eventually subsides.      Intermittent claudication of the LLE: patient saw Dr Sherwood for follow up and evaluation, patient takes Pletal and ASA, patient cannot follow up with Dr Sherwood now due to insurance, he will follow up with  vascular surgery when able to be scheduled       Hyperlipidemia: takes atorvastatin, labs were  completed     Lymphocytic colitis: mesalamine has been discontinued,  sees Dr Prado in [place] now.    COPD/ smoker: patient still smokes. He uses no inhaler therapy. Lung cancer screening was completed, he is scheduled for follow up CT next year also. Patient quit smoking for a few weeks, he resumed the smoking.  Patient is considering trial of Aculaser treatment in [place]. Patient states that treatment is \"guaranteed\". He would like to try this prior to Chantix.  Last lung cancer screening was completed on 4/30/2024. Patient still smokes 1 ppd, he has smoked for about 40 years.      Patient saw dermatology, he had skin lesions removed, basal cell carcinoma, focal squamous carcinoma in situ: no longer uses Efudex, patient had biopsy completed yesterday, result is pending     Vitamin D deficiency:  he has vitamin D, he does not take it regularly      B12 deficiency: patient had some numbness in the hands last summer, he does not have any numbness now. He had B12 injection completed x 1       ROS otherwise negative aside from what was mentioned above in HPI.      Problem List[1]      Medical History[2]  Surgical " "History[3]  Social History     Social History Narrative    Not on file         ALLERGIES  Patient has no known allergies.      MEDICATIONS  Medications Ordered Prior to Encounter[4]      PHYSICAL EXAM  /62 (BP Location: Left arm, Patient Position: Sitting, BP Cuff Size: Adult)   Pulse 69   Temp 36.1 °C (96.9 °F) (Temporal)   Resp 20   Ht 1.791 m (5' 10.5\")   Wt 77.1 kg (169 lb 14.4 oz)   SpO2 96%   BMI 24.03 kg/m²   Body mass index is 24.03 kg/m².  Gen: Alert, NAD  HEENT:  wears glasses, EOMI, conjunctiva and sclera normal in appearance, no thyromegaly or neck masses  Respiratory:  Lungs CTAB, slightly diminished breath sounds throughout, no wheezes or rhonchi  Cardiovascular:  irregular rate and rhythm noted today, rate is controlled. No M/R/G,  no carotid bruits, no peripheral edema   Neuro:  Gross motor and sensory intact  Skin:  no obvious lesions on exposed skin   MSK: tenderness noted over the dorsal left foot, no swelling is noted, multiple petechiae are noted on the dorsal distal left foot. Mobile nodule present on the extensor right elbow, it is not painful.       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   - Continue following with vascular as scheduled/recommended  Last Assessment & Plan:    Formatting of this note might be different from the original.   Chronic, managed by vascular surgery.         Current Assessment & Plan   To follow up with vascular surgery, quit smoking, continue Pletal         Relevant Orders    CBC and Auto Differential    Comprehensive Metabolic Panel    Chronic obstructive pulmonary disease, unspecified COPD type (Multi)    Relevant Orders    CT lung screening low dose    CBC and Auto Differential "    Comprehensive Metabolic Panel    Cobalamin deficiency    Overview   Last Assessment & Plan:    Formatting of this note might be different from the original.   - Obtain recheck of labwork to determine need for replacement  Last Assessment & Plan:    Formatting of this note might be different from the original.   - Obtain recheck of labwork to determine need for replacement         Current Assessment & Plan   B 12 level is low normal now, patient received one dose of B12 in the recent past, start B 12 1000 mcg daily         Relevant Medications    cyanocobalamin (Vitamin B-12) 500 mcg tablet    Other Relevant Orders    CBC and Auto Differential    Comprehensive Metabolic Panel    Vitamin B12    Food sticks on swallowing    Relevant Orders    FL modified barium swallow study    CBC and Auto Differential    Comprehensive Metabolic Panel    Hypertriglyceridemia    Relevant Orders    CBC and Auto Differential    Comprehensive Metabolic Panel    Lipid Panel    Irregular cardiac rhythm    Current Assessment & Plan   Check an ECG today, patient has a history of irregular rhythm         Relevant Orders    ECG 12 lead (Clinic Performed) (Completed)    CBC and Auto Differential    Comprehensive Metabolic Panel    TSH with reflex to Free T4 if abnormal    Lymphocytic colitis    Overview   Last Assessment & Plan:    Formatting of this note might be different from the original.   - Continue following with GI as scheduled/recommended  Last Assessment & Plan:    Formatting of this note might be different from the original.   Chronic, managed by GI.         Current Assessment & Plan   Follow up with GI, off of med therapy now         Relevant Orders    CBC and Auto Differential    Comprehensive Metabolic Panel    Primary hypertension - Primary    Current Assessment & Plan   BP is controlled, no med changes today         Relevant Orders    ECG 12 lead (Clinic Performed) (Completed)    CBC and Auto Differential    Comprehensive  Metabolic Panel    Albumin-Creatinine Ratio, Urine Random    Smoker    Current Assessment & Plan   Strongly encourage smoking cessation, follow up lung cancer CT screening ordered, patient will try laser treatment first, chantix will be ordered if needed         Relevant Orders    CT lung screening low dose    CBC and Auto Differential    Comprehensive Metabolic Panel    Vitamin D deficiency    Relevant Orders    CBC and Auto Differential    Comprehensive Metabolic Panel    Vitamin D 25-Hydroxy,Total (for eval of Vitamin D levels)     Other Visit Diagnoses         Olecranon bursitis of right elbow        Relevant Orders    Referral to Orthopedics and Sports Medicine    CBC and Auto Differential    Comprehensive Metabolic Panel      Left foot pain        Relevant Orders    XR foot left 1-2 views    CBC and Auto Differential    Comprehensive Metabolic Panel          Check labs as ordered, check ECG today, quit smoking, take B12 daily. 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    Chronic back pain    Chronic cough    Cobalamin deficiency    Daytime sleepiness    DDD (degenerative disc disease), lumbar    Depression    Erectile dysfunction    Generalized anxiety disorder with panic attacks    Hypertriglyceridemia    Insomnia    Snoring    Situational anxiety    Primary hypertension    Polydipsia    Peripheral arterial disease    Nonspecific colitis    Lymphocytic colitis    Irregular cardiac rhythm    Left hip pain    Left leg numbness    Numbness of lower extremity    Keratosis    Cancer of the skin, basal cell    Squamous cell carcinoma in situ (SCCIS) of skin    Vitamin D deficiency    Smoker    Food  sticks on swallowing   [2]   Past Medical History:  Diagnosis Date    Lymphocytic colitis 11/22/2019    Lymphocytic-plasmacytic colitis    Oral lesion 04/10/2024    Other specified health status     No pertinent past medical history    Periumbilical pain 09/06/2016    Periumbilical abdominal pain    Personal history of COVID-19 10/25/2021    Personal history of other diseases of the musculoskeletal system and connective tissue     History of low back pain    Personal history of other specified conditions 12/28/2016    History of diarrhea    Residual hemorrhoidal skin tags 11/22/2016    External hemorrhoids    Thrush, oral 04/10/2024   [3]   Past Surgical History:  Procedure Laterality Date    OTHER SURGICAL HISTORY  03/24/2021    Jaw surgery    SKIN BIOPSY  04/2024   [4]   Current Outpatient Medications on File Prior to Visit   Medication Sig Dispense Refill    aspirin 81 mg EC tablet Take 1 tablet (81 mg) by mouth once daily.      atorvastatin (Lipitor) 20 mg tablet Take 1 tablet (20 mg) by mouth once daily. 90 tablet 3    cholecalciferol (Vitamin D3) 50 mcg (2,000 unit) capsule Take 2 capsules (100 mcg) by mouth once daily. 200 capsule 2    cilostazol (Pletal) 100 mg tablet Take 1 tablet (100 mg) by mouth 2 times a day. 180 tablet 3    fluorouracil (Efudex) 5 % cream cream Apply topically to  your ears and face twice a day for 3 weeks. 40 g 1    lisinopril 10 mg tablet Take 1 tablet (10 mg) by mouth once daily. 100 tablet 2    loratadine 10 mg capsule Take 10 mg by mouth once daily.      [DISCONTINUED] mesalamine (Lialda) 1.2 gram EC tablet Take 1 tablet (1.2 g) by mouth 2 times a day. Do not crush, chew, or split. (Patient not taking: Reported on 6/17/2025) 180 tablet 1    [DISCONTINUED] sodium,potassium,mag sulfates (Suprep) 17.5-3.13-1.6 gram solution Take one bottle beginning at 6pm night before procedure and then take the other bottle 5 hours before procedure time as directed per instruction sheet (Patient  not taking: Reported on 6/17/2025) 354 mL 0     No current facility-administered medications on file prior to visit.

## 2025-06-19 ENCOUNTER — PHARMACY VISIT (OUTPATIENT)
Dept: PHARMACY | Facility: CLINIC | Age: 65
End: 2025-06-19
Payer: COMMERCIAL

## 2025-06-20 DIAGNOSIS — M70.31 BURSITIS OF RIGHT ELBOW, UNSPECIFIED BURSA: ICD-10-CM

## 2025-06-23 ENCOUNTER — APPOINTMENT (OUTPATIENT)
Dept: ORTHOPEDIC SURGERY | Facility: HOSPITAL | Age: 65
End: 2025-06-23
Payer: COMMERCIAL

## 2025-06-23 ENCOUNTER — HOSPITAL ENCOUNTER (OUTPATIENT)
Dept: RADIOLOGY | Facility: HOSPITAL | Age: 65
Discharge: HOME | End: 2025-06-23
Payer: COMMERCIAL

## 2025-06-23 VITALS — WEIGHT: 169 LBS | BODY MASS INDEX: 24.2 KG/M2 | HEIGHT: 70 IN

## 2025-06-23 DIAGNOSIS — M70.21 OLECRANON BURSITIS OF RIGHT ELBOW: ICD-10-CM

## 2025-06-23 DIAGNOSIS — M70.31 BURSITIS OF RIGHT ELBOW, UNSPECIFIED BURSA: ICD-10-CM

## 2025-06-23 PROCEDURE — 73080 X-RAY EXAM OF ELBOW: CPT | Mod: RT

## 2025-06-23 PROCEDURE — 3008F BODY MASS INDEX DOCD: CPT | Performed by: ORTHOPAEDIC SURGERY

## 2025-06-23 PROCEDURE — 99244 OFF/OP CNSLTJ NEW/EST MOD 40: CPT | Performed by: ORTHOPAEDIC SURGERY

## 2025-06-23 PROCEDURE — 4004F PT TOBACCO SCREEN RCVD TLK: CPT | Performed by: ORTHOPAEDIC SURGERY

## 2025-06-23 PROCEDURE — 99212 OFFICE O/P EST SF 10 MIN: CPT | Performed by: ORTHOPAEDIC SURGERY

## 2025-06-23 PROCEDURE — 73080 X-RAY EXAM OF ELBOW: CPT | Mod: RIGHT SIDE | Performed by: RADIOLOGY

## 2025-06-23 ASSESSMENT — ENCOUNTER SYMPTOMS
DEPRESSION: 0
LOSS OF SENSATION IN FEET: 0
OCCASIONAL FEELINGS OF UNSTEADINESS: 0

## 2025-06-23 NOTE — PROGRESS NOTES
NPV referred by Dr.Edward Mimi ROCK   Right Elbow Bursitis   XR Done Today     Patient having some swelling in the elbow for  month no pain. No known injury

## 2025-06-23 NOTE — PROGRESS NOTES
65 y.o. male presents today for evaluation of right elbow swelling for the past month. The patient reports no injury or trauma to the right elbow, just noticed it about a month ago, has gone down slightly over the past couple weeks. Pain is controlled. Patient reports no numbness and tingling.  Reports no previous surgeries, injections, or trauma to the area.  Reports pain worse with use, better at rest.   Pain dull ache at most if any pain.    Review of Systems    Constitutional: see HPI, no fever, no chills, not feeling tired, no significant weight gain or weight loss.   Eyes: No vision changes  ENT: no nosebleeds.   Cardiovascular: no chest pain.   Respiratory: no shortness of breath and no cough.   Gastrointestinal: no abdominal pain, no nausea, no vomiting and no diarrhea.   Musculoskeletal: per HPI  Neurological: no headache, no gait disturbances  Psychiatric: no depression and no sleep disturbances.   Endocrine: no muscle weakness and no muscle cramps.   Hematologic/Lymphatic: no swollen glands and no tendency for easy bruising or excessive swelling.     Patient's past medical history, past surgical history, allergies, and medications have been reviewed unless otherwise noted in the chart.     Olecranon Bursitis  Inspection:  no evidence of infection, no erythema, no edema, Palpation:  compartments are soft, palpable posterior elbow fluid collection, compressible confined to olecranon bursa, Range of Motion:  full elbow motion, Stability:  no elbow instability noted, Strength:  5/5 elbow flexion/extension, Skin:  intact, Vascular:  capillary refill <2 seconds distally, Sensation:  intact to light touch distally.     Constitutional   General appearance: Alert and in no acute distress. Well developed, well nourished.    Eyes   External Eye, Conjunctiva and lids: Normal external exam - pupils were equal in size, round, reactive to light (PERRL) with normal accommodation and extraocular movements intact (EOMI).    Ears, Nose, Mouth, and Throat   Hearing: Normal.   Neck   Neck: No neck mass was observed. Supple.   Pulmonary   Respiratory effort: No respiratory distress.   Cardiovascular   Examination of extremities: No peripheral edema.   Psychiatric   Judgment and insight: Intact.   Orientation to person, place, and time: Alert and oriented x 3.       Mood and affect: Normal.      XR -questionable fractured enthesophyte olecranon    X-rays were personally reviewed by me. Radiology reports were reviewed by me as well, if available at the time.      Right olecranon bursitis  Based on the history, physical exam and imaging studies above, the patient's presentation is consistent with the above diagnosis.  I had a long discussion with the patient regarding their presentation and the treatment options.  We discussed initial nonoperative versus operative management options as well as potential further diagnostic imaging.  We again discussed her treatment options going forward along with their associated risks and benefits. After thorough discussion, the patient has elected to proceed with conservative management. All questions were answered to the patients satisfaction who seems satisfied with the plan.  They will call the office with any questions/concerns.    Compression sleeve  Ace wrap  Voltaren gel as needed  Follow-up 6 to 8 weeks as needed no x-ray

## 2025-06-27 ENCOUNTER — HOSPITAL ENCOUNTER (OUTPATIENT)
Dept: RADIOLOGY | Facility: CLINIC | Age: 65
Discharge: HOME | End: 2025-06-27
Payer: COMMERCIAL

## 2025-06-27 DIAGNOSIS — M79.672 LEFT FOOT PAIN: ICD-10-CM

## 2025-06-27 PROCEDURE — 73620 X-RAY EXAM OF FOOT: CPT | Mod: LT

## 2025-06-27 PROCEDURE — RXMED WILLOW AMBULATORY MEDICATION CHARGE

## 2025-06-28 ENCOUNTER — PHARMACY VISIT (OUTPATIENT)
Dept: PHARMACY | Facility: CLINIC | Age: 65
End: 2025-06-28
Payer: COMMERCIAL

## 2025-07-10 ENCOUNTER — HOSPITAL ENCOUNTER (OUTPATIENT)
Dept: RADIOLOGY | Facility: HOSPITAL | Age: 65
Discharge: HOME | End: 2025-07-10
Payer: COMMERCIAL

## 2025-07-10 DIAGNOSIS — R13.10 FOOD STICKS ON SWALLOWING: ICD-10-CM

## 2025-07-10 DIAGNOSIS — R13.12 OROPHARYNGEAL DYSPHAGIA: Primary | ICD-10-CM

## 2025-07-10 PROCEDURE — 74230 X-RAY XM SWLNG FUNCJ C+: CPT | Performed by: RADIOLOGY

## 2025-07-10 PROCEDURE — 74230 X-RAY XM SWLNG FUNCJ C+: CPT

## 2025-07-10 PROCEDURE — 92611 MOTION FLUOROSCOPY/SWALLOW: CPT | Mod: GN | Performed by: SPEECH-LANGUAGE PATHOLOGIST

## 2025-07-10 PROCEDURE — 2500000005 HC RX 250 GENERAL PHARMACY W/O HCPCS: Performed by: INTERNAL MEDICINE

## 2025-07-10 RX ADMIN — BARIUM SULFATE 15 ML: 400 SUSPENSION ORAL at 09:58

## 2025-07-10 RX ADMIN — BARIUM SULFATE 65 ML: 400 SUSPENSION ORAL at 09:58

## 2025-07-10 RX ADMIN — BARIUM SULFATE 110 ML: 0.81 POWDER, FOR SUSPENSION ORAL at 09:57

## 2025-07-10 RX ADMIN — BARIUM SULFATE 15 ML: 400 PASTE ORAL at 09:58

## 2025-07-10 RX ADMIN — BARIUM SULFATE 700 MG: 700 TABLET ORAL at 09:58

## 2025-07-10 NOTE — PATIENT INSTRUCTIONS
DIET:   - Regular (IDDSI Level 7)  - Thin liquids (IDDSI Level 0)    Per the results of today's MBSS it is recommended pt perform the following strategies listed below:    STRATEGIES:  - Upright positioning for all PO intake  - Remain upright for >30 min after meals  - Alternate bites and sips  - Add moisture to foods if needed such as broth, gravy, sauces, condiments, dressings, marinades, jelly, butter, high water content vegetables like tomatoes, cucumbers, pickles, iceberg lettuce etc.

## 2025-07-10 NOTE — PROCEDURES
"Speech-Language Pathology    Outpatient Modified Barium Swallow Study    Patient Name: Pedro Figueroa \"Blake\"  MRN: [id number]  : 1960  Today's Date: 07/10/25   Start Time: 915    End Time: 1010    TECHNIQUE:  Modified Barium Swallow Study completed. Informed verbal consent obtained prior to completion of exam.     Pt given the followin trials 5ml THIN via syringe, 1 trial 20ml THIN via medicine cup, 1 trial of THIN via independent cup sip, 1 trial of THIN consecutive sips via cup (~60ml = 3 sips); 1 trial 5ml NECTAR via syringe, 1 trial 20ml  NECTAR via medicine cup, 1 trial of NECTAR via independent cup sip; 3 trials 5ml each HONEY via spoon; 2 trials 5ml each PUDDING via spoon; 1 trial Soft Solids (5 cubes pears/peaches drained); 1 trial Soft Solids (5 cubes pears/peaches in liquid); 1 trial Hard Solid (½ Rosana Doone cookie).     In addition the pt was given the followin Trial 20ml NECTAR via medicine cup while standing with an esophageal scan in A-P View.    1 Trial 5ml PUDDING via spoon while standing with an esophageal scan in A-P View.   1 Trial 13 mm Barium Tablet with water via cup while standing with an esophageal scan in A-P View.    A 1.9 cm or .75 inch (outer diameter) ring was placed under the chin and on the lateral, left side of the neck in order to complete objective measurements during swallowing. The anatomic structures and function of the oropharynx, larynx, hypopharynx and cervical esophagus were evaluated.      SLP: DAVONTE Lassiter   Contact info: [employer] secure chat; phone: [phone]      Reason for Referral: Pt reports for about 20 years he's had a variety of food, liquid and pills randomly get stuck at the base of his throat (points to suprasternal notch area).  He reports it is a daily occurrence.  It hasn't gotten any worse over the years, but decided it was time to mention it to his PCP. He reports that extra sips of a liquid will help the material move through.  He " "denies any food or liquid going down the \"wrong pipe\".  Never has had to have the Heimlich maneuver performed.  He denies any premature fullness.  Denies any daily occurrence of indigestion or heartburn.  He reports this only occurs with certain foods.     Pt reports he quit smoking 14 day ago.      Patient Hx: HTN, HLD, seasonal and environmental allergies, skin cancer.     Respiratory Status: Room air    Current diet: Regular diet Texture and Thin liquids     Pain:  Pain Scale: 0-10  Ratin      FINAL SPEECH RECOMMENDATIONS    DIET:   - Regular (IDDSI Level 7)  - Thin liquids (IDDSI Level 0)    Per the results of today's MBSS it is recommended pt perform the following strategies listed below:    STRATEGIES:  - Upright positioning for all PO intake  - Remain upright for >30 min after meals  - Alternate bites and sips  - Add moisture to foods if needed such as broth, gravy, sauces, condiments, dressings, marinades, jelly, butter, high water content vegetables like tomatoes, cucumbers, pickles, iceberg lettuce etc.       SLP PLAN:  Skilled SLP Services: No further skilled SLP intervention is warranted for dysphagia at this time.  SLP Frequency: N/A  Duration: N/A  Treatment/Interventions:   - Compensatory strategy training  - Patient/caregiver education      Discussed POC: Patient  Discussed Risks/Benefits: Yes  Patient/Caregiver Agreeable: Yes    Short term goals established 07/10/25:   N/A      Long term goals 07/10/25:   N/A    Education Provided to: Patient   SLP discussed results and recommendations of the MBS study while utilizing the MBS study video. In addition, SLP education regarding diet, diet modifications, compensatory strategies, anatomy and physiology of the swallow mechanism as well as risk for penetration and or aspiration.  Patient asked appropriate questions and SLP answered them.  Pt verbalized understanding     Treatment Provided Today: No.     Additional Medical Consults Suggested:   - ENT " consultation can be considered for direct visualization of the mucosal tissues since pt does c/o the sensation of material sticking in the throat.     Repeat study/dc plan:   Repeat MBS study as clinically indicated.      Patient's Presentation: Pt pleasant and cooperative with assessment. Pt seated in the [place] chair in a left lateral view. Pt fed self independently Thin and Mulat consistencies.  SLP fed pt the remaining consistencies to control bolus size.  Pts dentition: own     Mechanics of the Swallow Summary:   ORAL PHASE:  Lip Closure - No labial escape/anterior loss of bolus   Tongue Control During Bolus Hold - Cohesive bolus between tongue to palatal seal   Bolus prep/mastication - Timely and efficient mastication skills   Bolus transport/lingual motion - Brisk tongue motion for A-P movement of the bolus   Oral residue - Residue collection on oral structure  with solids.  Trace residues with liquids.     Penetration Before the Swallow - None  Aspiration Before the Swallow - None    Additional Comments: N/A     PHARYNGEAL PHASE:  Initiation of pharyngeal swallow - Bolus head at pit of pyriforms   Soft palate elevation - No bolus between soft palate/pharyngeal wall   Laryngeal elevation - Partial superior movement of thyroid cartilage and/or partial approximation of arytenoids to epiglottic petiole   Anterior hyoid excursion - Partial anterior movement   Epiglottic movement - Complete inversion    Laryngeal vestibule closure - Complete - no air/contrast in laryngeal vestibule   Pharyngeal stripping wave - Present, however, diminished   Pharyngeal contraction (A/P view) - Complete  Pharyngoesophageal segment opening - Partial distension/partial duration with partial obstruction of flow of bolus  - however, bolus does move through  Tongue base retraction - Narrow column of contrast or air between tongue base and pharyngeal wall   Pharyngeal residue - Trace residue within or on the pharyngeal structures      Penetration During the Swallow - None  Penetration After the Swallow - None  Aspiration During the Swallow - None  Aspiration After the Swallow - None      Reflexive Response - N/A -   Cued by Clinician Response - N/A -     Compensatory Strategies Attempted - chin tuck, but no significant difference noted.     Additional Comments - Pts epiglottis appears slightly longer and on occasion will collect a trace amount of residue that does clear with either a dry swallow or a sip of liquid.    Anatomical Abnormalities - Bony prominence(s) noted @ C5 and Indentation noted off the anterior esophageal wall during the swallow @ C4      ESOPHAGEAL PHASE:  Esophageal clearance - Complete clearance        SLP Impressions with Severity Rating:   Pt exhibited a normal to minimal oropharyngeal dysphagia upon completion of modified barium swallow study this date. Swallowing physiology is detailed above. There are no significant impairments most impacting swallowing safety and efficiency at this time.  Pt does have some mild pharyngeal deficits, but not impacting his swallow function. Patient demonstrated no episodes of penetration or aspiration during this exam.  Patient demonstrated trace to min oral and normal to trace pharyngeal residue that was reduced with a subsequent swallow.    *Of note: The A-P or Lateral bolus follow-through is not intended to be utilized as a diagnostic assessment of the esophagus, rather a tool to observe the biomechanical aspects of the swallow continuum and to inform the need for further evaluation by medical specialists, as applicable.     Images for this study are available in PACS.      OUTCOME MEASURES:  Functional Oral Intake Scale  Functional Oral Intake Scale: Level 7        total oral diet with no restrictions       Eating Assessment Tool (EAT-10)   0=No problem, 1=Mild problem, 2=Mild to moderate problem, 3=Moderate problem, 4=Severe problem    EAT 10  My swallowing problem has caused me  to lose weight.: 0  My swallowing problem interferes with my ability to go out for meals.: 0  Swallowing liquids takes extra effort.: 1  Swallowing solids takes extra effort.: 1  Swallowing pills takes extra effort.: 1  Swallowing is painful: 0  The pleasure of eating is affected by my swallowing.: 0  When I swallow food sticks in my throat.: 2  I cough when I eat.: 1  Swallowing is stressful: 0  EAT-10 TOTAL SCORE:: 6    A total score of 3 or above may indicate difficulty with swallowing safely and/or efficiently      Rosenbek's Penetration Aspiration Scale  Thin Liquids: 1. NO ASPIRATION & NO PENETRATION - no aspiration, contrast does not enter airway    [place] Thick Liquids: 1. NO ASPIRATION & NO PENETRATION - no aspiration, contrast does not enter airway    [place] Thick Liquids: 1. NO ASPIRATION & NO PENETRATION - no aspiration, contrast does not enter airway    Pudding/Puree: 1. NO ASPIRATION & NO PENETRATION - no aspiration, contrast does not enter airway    Soft Solids: 1. NO ASPIRATION & NO PENETRATION - no aspiration, contrast does not enter airway    Hard Solids: 1. NO ASPIRATION & NO PENETRATION - no aspiration, contrast does not enter airway

## 2025-07-15 ENCOUNTER — APPOINTMENT (OUTPATIENT)
Dept: VASCULAR SURGERY | Facility: HOSPITAL | Age: 65
End: 2025-07-15
Payer: COMMERCIAL

## 2025-08-26 ENCOUNTER — OFFICE VISIT (OUTPATIENT)
Dept: VASCULAR SURGERY | Facility: HOSPITAL | Age: 65
End: 2025-08-26
Payer: COMMERCIAL

## 2025-08-26 ENCOUNTER — APPOINTMENT (OUTPATIENT)
Dept: LAB | Facility: HOSPITAL | Age: 65
End: 2025-08-26
Payer: COMMERCIAL

## 2025-08-26 VITALS
SYSTOLIC BLOOD PRESSURE: 151 MMHG | HEART RATE: 68 BPM | BODY MASS INDEX: 25.68 KG/M2 | WEIGHT: 179 LBS | DIASTOLIC BLOOD PRESSURE: 77 MMHG

## 2025-08-26 DIAGNOSIS — I73.9 PAD (PERIPHERAL ARTERY DISEASE): Primary | ICD-10-CM

## 2025-08-26 LAB — COTININE UR QL SCN: NEGATIVE

## 2025-08-26 PROCEDURE — 3078F DIAST BP <80 MM HG: CPT | Performed by: SURGERY

## 2025-08-26 PROCEDURE — 99204 OFFICE O/P NEW MOD 45 MIN: CPT | Performed by: SURGERY

## 2025-08-26 PROCEDURE — 99214 OFFICE O/P EST MOD 30 MIN: CPT | Performed by: SURGERY

## 2025-08-26 PROCEDURE — 3077F SYST BP >= 140 MM HG: CPT | Performed by: SURGERY

## 2025-08-27 LAB
CREAT SERPL-MCNC: 1.13 MG/DL (ref 0.7–1.35)
EGFRCR SERPLBLD CKD-EPI 2021: 73 ML/MIN/1.73M2

## 2025-09-02 ENCOUNTER — HOSPITAL ENCOUNTER (OUTPATIENT)
Dept: RADIOLOGY | Facility: HOSPITAL | Age: 65
Discharge: HOME | End: 2025-09-02
Payer: COMMERCIAL

## 2025-09-02 DIAGNOSIS — I73.9 PAD (PERIPHERAL ARTERY DISEASE): ICD-10-CM

## 2025-09-02 PROCEDURE — 2550000001 HC RX 255 CONTRASTS: Mod: JW | Performed by: SURGERY

## 2025-09-02 PROCEDURE — 75635 CT ANGIO ABDOMINAL ARTERIES: CPT

## 2025-09-02 PROCEDURE — 75635 CT ANGIO ABDOMINAL ARTERIES: CPT | Performed by: RADIOLOGY

## 2025-09-02 RX ADMIN — IOHEXOL 90 ML: 350 INJECTION, SOLUTION INTRAVENOUS at 08:13

## 2025-12-18 ENCOUNTER — APPOINTMENT (OUTPATIENT)
Dept: PRIMARY CARE | Facility: CLINIC | Age: 65
End: 2025-12-18
Payer: COMMERCIAL